# Patient Record
Sex: FEMALE | Race: WHITE | NOT HISPANIC OR LATINO | Employment: OTHER | URBAN - METROPOLITAN AREA
[De-identification: names, ages, dates, MRNs, and addresses within clinical notes are randomized per-mention and may not be internally consistent; named-entity substitution may affect disease eponyms.]

---

## 2017-02-28 ENCOUNTER — GENERIC CONVERSION - ENCOUNTER (OUTPATIENT)
Dept: OTHER | Facility: OTHER | Age: 67
End: 2017-02-28

## 2017-04-12 ENCOUNTER — DOCTOR'S OFFICE (OUTPATIENT)
Dept: URBAN - METROPOLITAN AREA CLINIC 137 | Facility: CLINIC | Age: 67
Setting detail: OPHTHALMOLOGY
End: 2017-04-12
Payer: COMMERCIAL

## 2017-04-12 ENCOUNTER — OPTICAL OFFICE (OUTPATIENT)
Dept: URBAN - METROPOLITAN AREA CLINIC 146 | Facility: CLINIC | Age: 67
Setting detail: OPHTHALMOLOGY
End: 2017-04-12

## 2017-04-12 DIAGNOSIS — H04.123: ICD-10-CM

## 2017-04-12 DIAGNOSIS — H52.4: ICD-10-CM

## 2017-04-12 DIAGNOSIS — H25.13: ICD-10-CM

## 2017-04-12 DIAGNOSIS — Z79.891: ICD-10-CM

## 2017-04-12 PROCEDURE — 92134 CPTRZ OPH DX IMG PST SGM RTA: CPT | Performed by: OPHTHALMOLOGY

## 2017-04-12 PROCEDURE — S0500 DISPOS CONT LENS: HCPCS | Performed by: OPHTHALMOLOGY

## 2017-04-12 PROCEDURE — 92015 DETERMINE REFRACTIVE STATE: CPT | Performed by: OPHTHALMOLOGY

## 2017-04-12 PROCEDURE — 92014 COMPRE OPH EXAM EST PT 1/>: CPT | Performed by: OPHTHALMOLOGY

## 2017-04-12 ASSESSMENT — REFRACTION_OUTSIDERX
OS_ADD: +2.50
OD_VA3: 20/
OD_SPHERE: +2.50
OS_VA2: 20/25(J1)
OS_SPHERE: +3.00
OD_VA1: 20/20-
OS_CYLINDER: +0.25
OD_ADD: +2.50
OD_AXIS: 013
OD_CYLINDER: +0.50
OS_VA3: 20/
OD_SPHERE: +2.75
OU_VA: 20/
OD_VA3: 20/
OS_SPHERE: +3.25
OD_CYLINDER: +0.50
OD_VA1: 20/25+
OD_VA2: 20/25(J1)
OU_VA: 20/
OS_VA1: 20/30+
OS_VA2: 20/25(J1)
OS_AXIS: 090
OD_ADD: +2.50
OS_VA3: 20/
OS_ADD: +2.50
OS_VA1: 20/20-
OS_CYLINDER: +0.50
OS_AXIS: 034
OD_VA2: 20/25(J1)
OD_AXIS: 010

## 2017-04-12 ASSESSMENT — REFRACTION_AUTOREFRACTION
OD_AXIS: 024
OS_CYLINDER: +0.50
OS_AXIS: 069
OS_SPHERE: +3.00
OD_SPHERE: +3.00
OD_CYLINDER: +0.25

## 2017-04-12 ASSESSMENT — CONFRONTATIONAL VISUAL FIELD TEST (CVF)
OD_FINDINGS: FULL
OS_FINDINGS: FULL

## 2017-04-12 ASSESSMENT — REFRACTION_MANIFEST
OU_VA: 20/
OD_VA1: 20/
OD_VA3: 20/
OD_VA2: 20/
OS_VA1: 20/
OS_VA3: 20/
OS_VA2: 20/

## 2017-04-12 ASSESSMENT — REFRACTION_CURRENTRX
OD_OVR_VA: 20/
OS_OVR_VA: 20/
OD_ADD: +2.50
OS_CYLINDER: +0.50
OS_OVR_VA: 20/
OD_OVR_VA: 20/
OS_SPHERE: +2.75
OS_OVR_VA: 20/
OD_OVR_VA: 20/
OS_AXIS: 090
OS_VPRISM_DIRECTION: PROGS
OD_SPHERE: +2.25
OS_ADD: +2.50
OD_CYLINDER: +0.50
OD_AXIS: 010
OD_VPRISM_DIRECTION: PROGS

## 2017-04-12 ASSESSMENT — SPHEQUIV_DERIVED
OS_SPHEQUIV: 3.25
OD_SPHEQUIV: 3.125

## 2017-04-12 ASSESSMENT — VISUAL ACUITY
OS_BCVA: 20/20-2
OD_BCVA: 20/20-3

## 2017-05-04 ENCOUNTER — DOCTOR'S OFFICE (OUTPATIENT)
Dept: URBAN - METROPOLITAN AREA CLINIC 137 | Facility: CLINIC | Age: 67
Setting detail: OPHTHALMOLOGY
End: 2017-05-04
Payer: COMMERCIAL

## 2017-05-04 DIAGNOSIS — Z79.891: ICD-10-CM

## 2017-05-04 PROCEDURE — 92083 EXTENDED VISUAL FIELD XM: CPT | Performed by: OPHTHALMOLOGY

## 2017-11-06 ENCOUNTER — OPTICAL OFFICE (OUTPATIENT)
Dept: URBAN - METROPOLITAN AREA CLINIC 146 | Facility: CLINIC | Age: 67
Setting detail: OPHTHALMOLOGY
End: 2017-11-06

## 2017-11-06 ENCOUNTER — RX ONLY (RX ONLY)
Age: 67
End: 2017-11-06

## 2017-11-06 ENCOUNTER — DOCTOR'S OFFICE (OUTPATIENT)
Dept: URBAN - METROPOLITAN AREA CLINIC 137 | Facility: CLINIC | Age: 67
Setting detail: OPHTHALMOLOGY
End: 2017-11-06
Payer: COMMERCIAL

## 2017-11-06 DIAGNOSIS — Z79.891: ICD-10-CM

## 2017-11-06 DIAGNOSIS — H04.123: ICD-10-CM

## 2017-11-06 DIAGNOSIS — H52.4: ICD-10-CM

## 2017-11-06 DIAGNOSIS — H25.13: ICD-10-CM

## 2017-11-06 PROCEDURE — S0500 DISPOS CONT LENS: HCPCS | Performed by: OPHTHALMOLOGY

## 2017-11-06 PROCEDURE — 92014 COMPRE OPH EXAM EST PT 1/>: CPT | Performed by: OPHTHALMOLOGY

## 2017-11-06 ASSESSMENT — REFRACTION_OUTSIDERX
OD_AXIS: 010
OS_CYLINDER: +0.50
OD_SPHERE: +2.50
OD_ADD: +2.50
OD_VA3: 20/
OU_VA: 20/
OD_VA3: 20/
OS_AXIS: 034
OD_ADD: +2.50
OD_AXIS: 013
OS_AXIS: 090
OD_CYLINDER: +0.50
OS_CYLINDER: +0.25
OS_VA3: 20/
OD_SPHERE: +2.75
OS_VA2: 20/25(J1)
OD_VA2: 20/25(J1)
OS_SPHERE: +3.25
OD_VA2: 20/25(J1)
OS_SPHERE: +3.00
OS_VA2: 20/25(J1)
OD_VA1: 20/20-
OS_VA1: 20/20-
OS_ADD: +2.50
OS_ADD: +2.50
OS_VA3: 20/
OD_VA1: 20/25+
OU_VA: 20/
OS_VA1: 20/30+
OD_CYLINDER: +0.50

## 2017-11-06 ASSESSMENT — REFRACTION_CURRENTRX
OD_CYLINDER: +0.50
OS_OVR_VA: 20/
OD_OVR_VA: 20/
OD_ADD: +2.50
OS_OVR_VA: 20/
OS_AXIS: 090
OD_VPRISM_DIRECTION: PROGS
OS_SPHERE: +2.75
OD_SPHERE: +2.25
OD_OVR_VA: 20/
OS_VPRISM_DIRECTION: PROGS
OS_CYLINDER: +0.50
OS_ADD: +2.50
OD_OVR_VA: 20/
OS_OVR_VA: 20/
OD_AXIS: 010

## 2017-11-06 ASSESSMENT — REFRACTION_AUTOREFRACTION
OD_SPHERE: +3.00
OD_CYLINDER: +0.25
OS_AXIS: 069
OS_CYLINDER: +0.50
OD_AXIS: 024
OS_SPHERE: +3.00

## 2017-11-06 ASSESSMENT — REFRACTION_MANIFEST
OS_VA3: 20/
OS_VA1: 20/
OS_VA2: 20/
OD_VA3: 20/
OU_VA: 20/
OD_VA1: 20/
OD_VA2: 20/

## 2017-11-06 ASSESSMENT — VISUAL ACUITY
OS_BCVA: 20/25
OD_BCVA: 20/25

## 2017-11-06 ASSESSMENT — SPHEQUIV_DERIVED
OS_SPHEQUIV: 3.25
OD_SPHEQUIV: 3.125

## 2017-11-06 ASSESSMENT — CONFRONTATIONAL VISUAL FIELD TEST (CVF)
OD_FINDINGS: FULL
OS_FINDINGS: FULL

## 2018-05-07 ENCOUNTER — DOCTOR'S OFFICE (OUTPATIENT)
Dept: URBAN - METROPOLITAN AREA CLINIC 137 | Facility: CLINIC | Age: 68
Setting detail: OPHTHALMOLOGY
End: 2018-05-07
Payer: COMMERCIAL

## 2018-05-07 DIAGNOSIS — Z79.891: ICD-10-CM

## 2018-05-07 DIAGNOSIS — H04.123: ICD-10-CM

## 2018-05-07 DIAGNOSIS — H25.13: ICD-10-CM

## 2018-05-07 DIAGNOSIS — M06.9: ICD-10-CM

## 2018-05-07 PROCEDURE — 92083 EXTENDED VISUAL FIELD XM: CPT | Performed by: OPHTHALMOLOGY

## 2018-05-07 PROCEDURE — 92014 COMPRE OPH EXAM EST PT 1/>: CPT | Performed by: OPHTHALMOLOGY

## 2018-05-07 PROCEDURE — 92134 CPTRZ OPH DX IMG PST SGM RTA: CPT | Performed by: OPHTHALMOLOGY

## 2018-05-07 ASSESSMENT — CONFRONTATIONAL VISUAL FIELD TEST (CVF)
OD_FINDINGS: FULL
OS_FINDINGS: FULL

## 2018-05-07 ASSESSMENT — REFRACTION_OUTSIDERX
OD_VA2: 20/25(J1)
OS_VA1: 20/30+
OU_VA: 20/
OS_VA2: 20/25(J1)
OS_VA3: 20/
OS_SPHERE: +3.25
OD_CYLINDER: +0.50
OD_VA1: 20/20-
OD_SPHERE: +2.75
OD_ADD: +2.50
OS_AXIS: 034
OS_SPHERE: +3.00
OS_ADD: +2.50
OS_VA3: 20/
OD_VA1: 20/25+
OD_CYLINDER: +0.50
OS_VA2: 20/25(J1)
OS_CYLINDER: +0.25
OS_CYLINDER: +0.50
OD_VA3: 20/
OD_AXIS: 013
OS_ADD: +2.50
OS_VA1: 20/20-
OD_AXIS: 010
OD_VA3: 20/
OU_VA: 20/
OD_SPHERE: +2.50
OD_ADD: +2.50
OS_AXIS: 090
OD_VA2: 20/25(J1)

## 2018-05-07 ASSESSMENT — REFRACTION_CURRENTRX
OD_AXIS: 010
OS_OVR_VA: 20/
OS_ADD: +2.50
OD_SPHERE: +2.25
OD_VPRISM_DIRECTION: PROGS
OS_VPRISM_DIRECTION: PROGS
OS_CYLINDER: +0.50
OS_SPHERE: +2.75
OD_OVR_VA: 20/
OD_CYLINDER: +0.50
OD_ADD: +2.50
OS_OVR_VA: 20/
OS_AXIS: 090
OS_OVR_VA: 20/
OD_OVR_VA: 20/
OD_OVR_VA: 20/

## 2018-05-07 ASSESSMENT — REFRACTION_AUTOREFRACTION
OS_AXIS: 069
OD_CYLINDER: +0.25
OS_SPHERE: +3.00
OS_CYLINDER: +0.50
OD_AXIS: 024
OD_SPHERE: +3.00

## 2018-05-07 ASSESSMENT — REFRACTION_MANIFEST
OS_VA2: 20/
OD_VA3: 20/
OD_VA1: 20/
OS_VA1: 20/
OD_VA2: 20/
OU_VA: 20/
OS_VA3: 20/

## 2018-05-07 ASSESSMENT — VISUAL ACUITY
OD_BCVA: 20/40
OS_BCVA: 20/40

## 2018-05-07 ASSESSMENT — SPHEQUIV_DERIVED
OD_SPHEQUIV: 3.125
OS_SPHEQUIV: 3.25

## 2018-06-11 ENCOUNTER — OFFICE VISIT (OUTPATIENT)
Dept: OBGYN CLINIC | Facility: CLINIC | Age: 68
End: 2018-06-11
Payer: MEDICARE

## 2018-06-11 VITALS
BODY MASS INDEX: 22.78 KG/M2 | WEIGHT: 116 LBS | SYSTOLIC BLOOD PRESSURE: 122 MMHG | DIASTOLIC BLOOD PRESSURE: 64 MMHG | HEIGHT: 60 IN

## 2018-06-11 DIAGNOSIS — Z01.419 GYNECOLOGIC EXAM NORMAL: Primary | ICD-10-CM

## 2018-06-11 PROCEDURE — G0101 CA SCREEN;PELVIC/BREAST EXAM: HCPCS | Performed by: OBSTETRICS & GYNECOLOGY

## 2018-06-11 RX ORDER — HYDROXYCHLOROQUINE SULFATE 200 MG/1
200 TABLET, FILM COATED ORAL 2 TIMES DAILY WITH MEALS
COMMUNITY
End: 2021-08-13

## 2018-06-11 RX ORDER — SERTRALINE HYDROCHLORIDE 25 MG/1
50 TABLET, FILM COATED ORAL DAILY
COMMUNITY
End: 2021-08-13

## 2018-06-11 RX ORDER — POTASSIUM BICARB/MAG COMBO 21 500-300 MG
POWDER EFFERVESCENT (GRAM) ORAL
COMMUNITY

## 2018-06-11 RX ORDER — HYDROXYCHLOROQUINE SULFATE 200 MG/1
TABLET, FILM COATED ORAL DAILY
COMMUNITY
End: 2021-08-13

## 2018-06-11 NOTE — PROGRESS NOTES
Lanny Portillo is a 70 y o  female  who presents for annual well woman exam  Periods are absent since age 51  No bleeding, spotting, or discharge  Patient reports No hot flashes/night sweats, some difficulties with intercourse,  has ED and patient is having some vaginal dryness there using lubrication with some success,  sleeping fairly well  Patient has some evidence of lichen sclerosis fully counseled advised Cortaid 10 ointment and local care  Patient to call if this is not managing her symptoms  Patient also being treated for lymphoma doing well and in remission and she is seeing Dr Anton Lyon regularly and still sees Dr Iain Ruth for her breast care  Current contraception: post menopausal status  History of abnormal Pap smear: no  Family history of uterine or ovarian cancer: no  Regular self breast exam: yes  History of abnormal mammogram: no  Family history of breast cancer: no    Menstrual History:  OB History      Para Term  AB Living    2 2            SAB TAB Ectopic Multiple Live Births                      Menarche age: 14  No LMP recorded       Patient had mammogram a few months ago with Dr Iain Ruth and had a stable bone density 2017 she will be seeing Dr Gordon Juarez  The following portions of the patient's history were reviewed and updated as appropriate: allergies, current medications, past family history, past medical history, past social history, past surgical history and problem list   Past Medical History:   Diagnosis Date    Arthritis     IBS (irritable bowel syndrome)     Non Hodgkin's lymphoma ([facility] [address] )      Past Surgical History:   Procedure Laterality Date    APPENDECTOMY       SECTION      MASS EXCISION      in bladder due to lymphoma    SMALL INTESTINE SURGERY      resection    TONSILLECTOMY           OB History      Para Term  AB Living    2 2            SAB TAB Ectopic Multiple Live Births Review of Systems  Review of Systems   Constitutional: Negative for chills, fatigue, fever and unexpected weight change  HENT: Negative for dental problem, sinus pain and sinus pressure  Eyes: Negative for visual disturbance  Respiratory: Negative for cough, shortness of breath and wheezing  Cardiovascular: Negative for chest pain and leg swelling  Gastrointestinal: Negative for constipation, diarrhea, nausea and vomiting  Genitourinary: Negative for menstrual problem, pelvic pain and urgency  Musculoskeletal: Negative for back pain  Arthritis     Allergic/Immunologic: Negative for environmental allergies  Neurological: Negative for dizziness and headaches  Objective     Vitals:    18 0937   BP: 122/64         General:   alert and oriented, in no acute distress, alert, appears stated age and cooperative   Heart: regular rate and rhythm, S1, S2 normal, no murmur, click, rub or gallop   Lungs: clear to auscultation bilaterally   Abdomen: soft, non-tender, without masses or organomegaly   Vulva: Some signs of lichen sclerosis no active erythema   Vagina: normal mucosa   Cervix: no lesions   Uterus: mobile, non-tender   Adnexa: normal adnexa and no mass, fullness, tenderness   Breast inspection negative, no nipple discharge or bleeding, no masses or nodularity palpable  Rectal deferred, just had colonoscopy 2017  Pupils equal round reactive to light and accommodation  Throat clear no lesions or findings  Thyroid normal no masses or nodules            Assessment   77-year-old  recently diagnosed in 2017 with lymphoma in remission currently  Otherwise doing well  Plan   Pap smear not indicated  Patient has never had any abnormal   Patient has some signs of lichen sclerosis instructed on local care and treatment  Patient call if needs stronger steroid  Patient should return in 2 years or sooner as needed  Following breast with dr Keke Lemus

## 2019-05-02 ENCOUNTER — TRANSCRIBE ORDERS (OUTPATIENT)
Dept: ADMINISTRATIVE | Facility: HOSPITAL | Age: 69
End: 2019-05-02

## 2019-05-02 DIAGNOSIS — Z85.72 H/O NON-HODGKIN'S LYMPHOMA: Primary | ICD-10-CM

## 2019-05-08 ENCOUNTER — HOSPITAL ENCOUNTER (OUTPATIENT)
Dept: RADIOLOGY | Facility: HOSPITAL | Age: 69
Discharge: HOME | End: 2019-05-08
Attending: RADIOLOGY

## 2019-05-08 DIAGNOSIS — Z71.2 PERSON CONSULTING FOR EXPLANATION OF EXAMINATION OR TEST FINDING: ICD-10-CM

## 2019-11-15 ENCOUNTER — TELEPHONE (OUTPATIENT)
Dept: OBGYN CLINIC | Facility: CLINIC | Age: 69
End: 2019-11-15

## 2019-11-15 NOTE — TELEPHONE ENCOUNTER
Pt called to discuss a rash she has occurring on her genital area  Pt feels it has spread as well  Pt states it was from her cruise being in the pools and hot tubes  Pt has tried OTC vagisil cream and disitin cream either have helped  Pt states it is only a rash with irration  Denies any odor, discharge or pain  Pt called her PCP prior to calling our office who did not have apts either  What would you advise?

## 2020-01-07 ENCOUNTER — TELEPHONE (OUTPATIENT)
Dept: OBGYN CLINIC | Facility: CLINIC | Age: 70
End: 2020-01-07

## 2020-01-08 ENCOUNTER — OFFICE VISIT (OUTPATIENT)
Dept: OBGYN CLINIC | Facility: CLINIC | Age: 70
End: 2020-01-08
Payer: COMMERCIAL

## 2020-01-08 VITALS
SYSTOLIC BLOOD PRESSURE: 152 MMHG | HEIGHT: 60 IN | WEIGHT: 127 LBS | DIASTOLIC BLOOD PRESSURE: 82 MMHG | BODY MASS INDEX: 24.94 KG/M2

## 2020-01-08 DIAGNOSIS — N89.8 VAGINAL ITCHING: ICD-10-CM

## 2020-01-08 DIAGNOSIS — N90.4 LICHEN SCLEROSUS OF FEMALE GENITALIA: Primary | ICD-10-CM

## 2020-01-08 PROBLEM — Z85.79 ENCOUNTER FOR FOLLOW-UP SURVEILLANCE OF DIFFUSE LARGE B-CELL LYMPHOMA: Status: ACTIVE | Noted: 2019-09-07

## 2020-01-08 PROBLEM — C83.99: Status: ACTIVE | Noted: 2019-09-08

## 2020-01-08 PROBLEM — Z08 ENCOUNTER FOR FOLLOW-UP SURVEILLANCE OF DIFFUSE LARGE B-CELL LYMPHOMA: Status: ACTIVE | Noted: 2019-09-07

## 2020-01-08 PROBLEM — Z92.22: Status: ACTIVE | Noted: 2019-09-07

## 2020-01-08 PROCEDURE — 99214 OFFICE O/P EST MOD 30 MIN: CPT | Performed by: NURSE PRACTITIONER

## 2020-01-08 RX ORDER — CLOBETASOL PROPIONATE 0.5 MG/G
OINTMENT TOPICAL 2 TIMES DAILY
Qty: 60 G | Refills: 1 | Status: SHIPPED | OUTPATIENT
Start: 2020-01-08 | End: 2020-01-22

## 2020-01-08 NOTE — ASSESSMENT & PLAN NOTE
Wet mount absent yeast hyphae, trich, clue cells  Genital culture collected and sent per patient request  Wants to make sure she doesn't have any other infections  Will call with results and follow up accordingly

## 2020-01-08 NOTE — PROGRESS NOTES
Assessment/Plan:    Lichen sclerosus of female genitalia  Reviewed with patient diagnosis of Lichen Sclerosus first seen on exam in 2018  Explained diagnosis and likely a flare up that is causing such intense itching  Information on Lichen given to patient  Rx for clobetasol cream BID x 2 weeks then 1-3 x weekly thereafter x 6-12 months  Vaginal itching  Wet mount absent yeast hyphae, trich, clue cells  Genital culture collected and sent per patient request  Wants to make sure she doesn't have any other infections  Will call with results and follow up accordingly  Diagnoses and all orders for this visit:    Lichen sclerosus of female genitalia  -     clobetasol (TEMOVATE) 0 05 % ointment; Apply topically 2 (two) times a day for 14 days    Vaginal itching  -     GP Culture, Genital          Subjective:      Patient ID: Bere Panda is a 78 y o  female  Pt presents with complaints of vaginal itching  She was on a cruise in November when symptoms began  She started with a rash in vaginal area  She tried OTC Vagisil and Desitin, with no relief  She called office and was advised OTC Hydrocortisone cream and coconut oil  Has not seen any improvement  Itching is mainly external      Denies any new products vaginally  Denies any new sexual partners  - discharge  + odor (musty)  + itching  - burning    Very itchy  Denies any bleeding  Cold water alleviates, hot shower aggravates  The following portions of the patient's history were reviewed and updated as appropriate: allergies, current medications, past family history, past medical history, past social history, past surgical history and problem list     Review of Systems   Genitourinary:        Vaginal itching     All other systems reviewed and are negative          Objective:      /82 (BP Location: Right arm, Patient Position: Sitting, Cuff Size: Standard)   Ht 5' (1 524 m)   Wt 57 6 kg (127 lb)   BMI 24 80 kg/m² Physical Exam   Constitutional: She is oriented to person, place, and time  She appears well-developed and well-nourished  HENT:   Head: Normocephalic and atraumatic  Cardiovascular: Normal rate, regular rhythm and normal heart sounds  Pulmonary/Chest: Effort normal and breath sounds normal    Abdominal: Soft  Bowel sounds are normal  She exhibits no distension and no mass  There is no tenderness  There is no rebound and no guarding  Genitourinary: Vagina normal and uterus normal        No labial fusion  There is no rash, tenderness, lesion or injury on the right labia  There is no rash, tenderness, lesion or injury on the left labia  Cervix exhibits no motion tenderness, no discharge and no friability  Right adnexum displays no mass, no tenderness and no fullness  Left adnexum displays no mass, no tenderness and no fullness  Musculoskeletal: Normal range of motion  Neurological: She is alert and oriented to person, place, and time  Skin: Skin is warm and dry  Psychiatric: She has a normal mood and affect   Her behavior is normal  Judgment and thought content normal

## 2020-01-08 NOTE — ASSESSMENT & PLAN NOTE
Reviewed with patient diagnosis of Lichen Sclerosus first seen on exam in 2018  Explained diagnosis and likely a flare up that is causing such intense itching  Information on Lichen given to patient  Rx for clobetasol cream BID x 2 weeks then 1-3 x weekly thereafter x 6-12 months

## 2020-01-13 LAB — SL AMB GENITAL CULTURE: NORMAL

## 2020-11-17 ENCOUNTER — TRANSCRIBE ORDERS (OUTPATIENT)
Dept: ADMINISTRATIVE | Facility: HOSPITAL | Age: 70
End: 2020-11-17

## 2020-11-17 ENCOUNTER — HOSPITAL ENCOUNTER (OUTPATIENT)
Dept: RADIOLOGY | Facility: HOSPITAL | Age: 70
Discharge: HOME | End: 2020-11-17
Attending: INTERNAL MEDICINE
Payer: COMMERCIAL

## 2020-11-17 DIAGNOSIS — M70.62 TROCHANTERIC BURSITIS OF LEFT HIP: ICD-10-CM

## 2020-11-17 DIAGNOSIS — M25.552 LEFT HIP PAIN: Primary | ICD-10-CM

## 2020-11-17 DIAGNOSIS — M25.552 LEFT HIP PAIN: ICD-10-CM

## 2020-11-17 PROCEDURE — 73502 X-RAY EXAM HIP UNI 2-3 VIEWS: CPT

## 2021-01-21 ENCOUNTER — TELEMEDICINE (OUTPATIENT)
Dept: FAMILY MEDICINE CLINIC | Facility: CLINIC | Age: 71
End: 2021-01-21
Payer: COMMERCIAL

## 2021-01-21 VITALS — WEIGHT: 126 LBS | BODY MASS INDEX: 23.19 KG/M2 | HEIGHT: 62 IN

## 2021-01-21 DIAGNOSIS — R41.3 MEMORY IMPAIRMENT: ICD-10-CM

## 2021-01-21 DIAGNOSIS — C83.99: ICD-10-CM

## 2021-01-21 DIAGNOSIS — R41.3 MEMORY PROBLEM: Primary | ICD-10-CM

## 2021-01-21 PROCEDURE — 99203 OFFICE O/P NEW LOW 30 MIN: CPT | Performed by: FAMILY MEDICINE

## 2021-01-21 NOTE — PROGRESS NOTES
Virtual Regular Visit      Assessment/Plan:    Problem List Items Addressed This Visit        Other    Non-follicular (diffuse) lymphoma, unspecified, extranodal and solid organ sites [facility])     Pt states has been in remission for 3 yrs         Memory impairment     Appears mild pt wishes to see neurologist  Consult requested           Other Visit Diagnoses     Memory problem    -  Primary    Relevant Orders    Ambulatory referral to Neurology               Reason for visit is   Chief Complaint   Patient presents with    Annual Exam    Memory Loss    Virtual Regular Visit        Encounter provider Mercy Hernandez MD    Provider located at [address] [place] [id number] [phone]      Recent Visits  No visits were found meeting these conditions  Showing recent visits within past 7 days and meeting all other requirements     Today's Visits  Date Type Provider Dept   01/21/21 Telemedicine Mercy Hernandez MD [address] today's visits and meeting all other requirements     Future Appointments  No visits were found meeting these conditions  Showing future appointments within next 150 days and meeting all other requirements        The patient was identified by name and date of birth  Gerardo Gaviria was informed that this is a telemedicine visit and that the visit is being conducted through [employer] and patient was informed that this is not a secure, HIPAA-compliant platform  She agrees to proceed     My office door was closed  No one else was in the room  She acknowledged consent and understanding of privacy and security of the video platform  The patient has agreed to participate and understands they can discontinue the visit at any time  Patient is aware this is a billable service  Subjective  Gerardo Gaviria is a 69 y o  female pt not seen in over 5 yrs   pt had stage IV NH lymphoma pt has been in remission for 3 yrs sees dr Jennifer Leung oncology Dr Shae Mckeon urology  And GI Dr Magdi Mckeon   pt is concerned about her memory and wants to see a neurologist mother has altzheimers ot is able to  Manage her finances  Play er music forgets where she puts things or occasionally forgets names     HPI     Past Medical History:   Diagnosis Date    Anxiety     Arthritis     Bladder tumor     IBS (irritable bowel syndrome)     Non Hodgkin's lymphoma ([address] ) 2017    Renal calculi     Vasculitis of skin        Past Surgical History:   Procedure Laterality Date    APPENDECTOMY      BREAST BIOPSY       SECTION      X2    COLONOSCOPY      CYSTOSCOPY W/ DILATION OF BLADDER  2017    CYSTOSCOPY W/ RETROGRADES      0690,6379    CYSTOSCOPY W/ URETERAL STENT PLACEMENT      [id number]    CYSTOSCOPY W/ URETEROSCOPY          MASS EXCISION      in bladder due to [address]      resection    TONSILLECTOMY         Current Outpatient Medications   Medication Sig Dispense Refill    Cyanocobalamin (VITAMIN B-12) 3000 MCG/ML LIQD Place under the tongue      hydroxychloroquine (PLAQUENIL) 200 mg tablet Daily      Multiple Vitamins-Minerals (CENTRUM SILVER 50+WOMEN PO) Take by mouth      Polyethylene Glycol 3350 (MIRALAX PO) Take by mouth      sertraline (ZOLOFT) 50 mg tablet Take 50 mg by mouth daily      clobetasol (TEMOVATE) 0 05 % ointment Apply topically 2 (two) times a day for 14 days 60 g 1    hydroxychloroquine (PLAQUENIL) 200 mg tablet Take 200 mg by mouth 2 (two) times a day with meals      sertraline (ZOLOFT) 25 mg tablet Take 50 mg by mouth daily      Sertraline HCl (ZOLOFT PO) sertraline       No current facility-administered medications for this visit  Allergies   Allergen Reactions    Levofloxacin Hives    Penicillins Hives and Rash       Review of Systems   Neurological: Dizziness: problems with memory         Video Exam    Vitals:    21 1328   Weight: 57 2 kg (126 lb)   Height: 5' 2" (1 575 m)       Physical Exam  Constitutional:       General: She is not in acute distress  Appearance: Normal appearance  She is well-developed  She is not ill-appearing  Neck:      Musculoskeletal: Normal range of motion  Thyroid: No thyromegaly  Cardiovascular:      Rate and Rhythm: Normal rate  Pulmonary:      Effort: Pulmonary effort is normal  No respiratory distress  Breath sounds: Normal breath sounds  Neurological:      General: No focal deficit present  Mental Status: She is alert and oriented to person, place, and time  Mental status is at baseline  Psychiatric:         Behavior: Behavior normal          Thought Content: Thought content normal           I spent 15 minutes directly with the patient during this visit      [address] [place] [address] acknowledges that she has consented to an online visit or consultation  She understands that the online visit is based solely on information provided by her, and that, in the absence of a face-to-face physical evaluation by the physician, the diagnosis she receives is both limited and provisional in terms of accuracy and completeness  This is not intended to replace a full medical face-to-face evaluation by the physician  Dina Jim understands and accepts these terms

## 2021-01-29 ENCOUNTER — TELEPHONE (OUTPATIENT)
Dept: GASTROENTEROLOGY | Facility: CLINIC | Age: 71
End: 2021-01-29

## 2021-02-13 DIAGNOSIS — Z23 ENCOUNTER FOR IMMUNIZATION: ICD-10-CM

## 2021-02-17 ENCOUNTER — IMMUNIZATIONS (OUTPATIENT)
Dept: FAMILY MEDICINE CLINIC | Facility: HOSPITAL | Age: 71
End: 2021-02-17

## 2021-02-17 DIAGNOSIS — Z23 ENCOUNTER FOR IMMUNIZATION: Primary | ICD-10-CM

## 2021-02-17 PROCEDURE — 91300 SARS-COV-2 / COVID-19 MRNA VACCINE (PFIZER-BIONTECH) 30 MCG: CPT

## 2021-02-17 PROCEDURE — 0001A SARS-COV-2 / COVID-19 MRNA VACCINE (PFIZER-BIONTECH) 30 MCG: CPT

## 2021-03-09 ENCOUNTER — IMMUNIZATIONS (OUTPATIENT)
Dept: FAMILY MEDICINE CLINIC | Facility: HOSPITAL | Age: 71
End: 2021-03-09

## 2021-03-09 DIAGNOSIS — Z23 ENCOUNTER FOR IMMUNIZATION: Primary | ICD-10-CM

## 2021-03-09 PROCEDURE — 91300 SARS-COV-2 / COVID-19 MRNA VACCINE (PFIZER-BIONTECH) 30 MCG: CPT

## 2021-03-09 PROCEDURE — 0002A SARS-COV-2 / COVID-19 MRNA VACCINE (PFIZER-BIONTECH) 30 MCG: CPT

## 2021-03-24 DIAGNOSIS — R93.89 ENDOMETRIAL THICKENING ON ULTRASOUND: Primary | ICD-10-CM

## 2021-03-24 NOTE — PROGRESS NOTES
Pelvic ultrasound ordered to evaluate endometrial lining    Pt will call central scheduling to make appt, most likely at [address] [place] [address]

## 2021-03-26 ENCOUNTER — HOSPITAL ENCOUNTER (OUTPATIENT)
Dept: ULTRASOUND IMAGING | Facility: HOSPITAL | Age: 71
Discharge: HOME | End: 2021-03-26
Payer: COMMERCIAL

## 2021-03-26 DIAGNOSIS — R93.89 ENDOMETRIAL THICKENING ON ULTRASOUND: ICD-10-CM

## 2021-03-26 PROCEDURE — 76856 US EXAM PELVIC COMPLETE: CPT

## 2021-03-26 PROCEDURE — 76830 TRANSVAGINAL US NON-OB: CPT

## 2021-04-01 ENCOUNTER — TELEPHONE (OUTPATIENT)
Dept: NEUROLOGY | Facility: CLINIC | Age: 71
End: 2021-04-01

## 2021-04-01 NOTE — TELEPHONE ENCOUNTER
Best contact number for patient: [phone]    Referring provider and telephone number: Carla Dillon       Primary Care Provider Name and if affiliated with [facility]: Same    Reason for Appointment/Dx:  Memory    Have you seen and followed up with a pediatric Neurologist for this disease in the past?  No    Neurology Location patient would like to be seen: [facility] received? Yes                                                 Records Received?      Have you ever seen another Neurologist?   No    Insurance Information    Insurance Name: Shay Gonzalez    Notes: Mailed new patient paperwork    Appointment date: 06/22/21 with Dr Clive Cummings

## 2021-04-20 ENCOUNTER — PROCEDURE VISIT (OUTPATIENT)
Dept: OBGYN CLINIC | Facility: CLINIC | Age: 71
End: 2021-04-20

## 2021-04-20 VITALS
BODY MASS INDEX: 23.05 KG/M2 | DIASTOLIC BLOOD PRESSURE: 66 MMHG | SYSTOLIC BLOOD PRESSURE: 130 MMHG | TEMPERATURE: 97.3 F | WEIGHT: 126 LBS

## 2021-04-20 DIAGNOSIS — R93.89 THICKENED ENDOMETRIUM: Primary | ICD-10-CM

## 2021-04-20 PROBLEM — Z92.21 PERSONAL HISTORY OF CHEMOTHERAPY: Status: ACTIVE | Noted: 2020-10-31

## 2021-04-20 PROCEDURE — 88305 TISSUE EXAM BY PATHOLOGIST: CPT | Performed by: PATHOLOGY

## 2021-04-20 RX ORDER — POLYETHYLENE GLYCOL 1000
POWDER (GRAM) MISCELLANEOUS
COMMUNITY
End: 2021-12-06 | Stop reason: CLARIF

## 2021-04-20 RX ORDER — CLOBETASOL PROPIONATE 0.5 MG/G
CREAM TOPICAL
COMMUNITY
End: 2022-04-01

## 2021-04-20 NOTE — PROGRESS NOTES
Endometrial biopsy    Date/Time: 2021 2:01 PM  Performed by: KATY Mann  Authorized by: KATY Mann   Universal Protocol:  Procedure performed by:  Consent: Verbal consent obtained  Written consent not obtained  Consent given by: patient  Time out: Immediately prior to procedure a "time out" was called to verify the correct patient, procedure, equipment, support staff and site/side marked as required  Timeout called at: 2021 1:20 PM   Patient understanding: patient states understanding of the procedure being performed  Patient consent: the patient's understanding of the procedure matches consent given  Procedure consent: procedure consent matches procedure scheduled  Relevant documents: relevant documents present and verified  Test results: test results available and properly labeled  Site marked: the operative site was not marked  Radiology Images displayed and confirmed  If images not available, report reviewed: imaging studies available  Patient identity confirmed: verbally with patient      Indication:     Indications comment: Thickened endometrial lining  Pre-procedure:     Premeds:  Acetaminophen  Procedure:     Procedure: endometrial biopsy with Pipelle      A bivalve speculum was placed in the vagina: yes      Cervix cleaned and prepped: yes      A paracervical block was performed: no      An intracervical block was performed: no      The cervix was dilated: no      Uterus sounded: yes      Uterus sound depth (cm):  6    Curettes used:  1    Specimen collected: specimen collected and sent to pathology      Patient tolerated procedure well with no complications: yes    Findings:     Uterus size:  Non-gravid    Cervix: stenotic      Adnexa: normal    Comments:     Procedure comments:     here for endometrial biopsy  Was found incidentally to have thickened endometrium on CT scan  Pelvic ultrasound  Resulted endometrial lining of 7 mm    Risks, benefits and alternatives reviewed  Procedure reviewed  Risks including but not limited to insufficient specimen, infection, bleeding, pain reviewed  Patient verbalized understanding desires endometrial biopsy today  Patient tolerated fairly  Biopsy sent for pathology   of note thin black fluid was obtained on 1st pass of Pipelle  Sent to pathology with tissue   will call with results   Signs and symptoms report reviewed

## 2021-04-20 NOTE — PATIENT INSTRUCTIONS
Endometrial Biopsy   WHAT YOU NEED TO KNOW:   Endometrial biopsy is a procedure to remove a tissue sample from the lining of your uterus  This procedure is done through your vagina  WHILE YOU ARE HERE:   Before your procedure:   · Informed consent  is a legal document that explains the tests, treatments, or procedures that you may need  Informed consent means you understand what will be done and can make decisions about what you want  You give your permission when you sign the consent form  You can have someone sign this form for you if you are not able to sign it  You have the right to understand your medical care in words you know  Before you sign the consent form, understand the risks and benefits of what will be done  Make sure all your questions are answered  · NSAIDs  decrease swelling and pain  You may need to take an NSAID before your procedure  Follow your healthcare provider's instruction on when to take it  During your procedure: You will be awake during the procedure  An ultrasound or hysteroscope (tube with a light and a camera on the end) may be used  This helps your healthcare provider see inside your uterus to find the best spot to get the tissue sample  He or she will then insert a speculum into your vagina  This is the same tool used during a Pap smear  The speculum allows your healthcare provider to see inside your vagina to your cervix  He or she may need to numb your cervix  Your healthcare provider will insert a small tube into your vagina and cervix to remove a piece of tissue from the lining of your uterus  The tissue sample will be sent to a lab to be tested  After your procedure:  Do not get up until your healthcare provider says it is okay  When your healthcare provider sees that you are okay, you may be able to go home  You may have mild pain, cramping, or spotting for a few days after your procedure  RISKS:   You could get an infection after your procedure   Your uterus may be damaged  Damage can cause heavy bleeding and pain  You may need surgery to repair the damage  CARE AGREEMENT:   You have the right to help plan your care  Learn about your health condition and how it may be treated  Discuss treatment options with your healthcare providers to decide what care you want to receive  You always have the right to refuse treatment  © Copyright [address] Information is for End User's use only and may not be sold, redistributed or otherwise used for commercial purposes  All illustrations and images included in CareNotes® are the copyrighted property of A D A M , Inc  or [facility] [employer]   The above information is an  only  It is not intended as medical advice for individual conditions or treatments  Talk to your doctor, nurse or pharmacist before following any medical regimen to see if it is safe and effective for you

## 2021-06-22 ENCOUNTER — CONSULT (OUTPATIENT)
Dept: NEUROLOGY | Facility: CLINIC | Age: 71
End: 2021-06-22
Payer: COMMERCIAL

## 2021-06-22 VITALS
HEART RATE: 67 BPM | BODY MASS INDEX: 22.68 KG/M2 | WEIGHT: 124 LBS | SYSTOLIC BLOOD PRESSURE: 134 MMHG | DIASTOLIC BLOOD PRESSURE: 72 MMHG

## 2021-06-22 DIAGNOSIS — R41.3 MEMORY PROBLEM: ICD-10-CM

## 2021-06-22 PROCEDURE — 99203 OFFICE O/P NEW LOW 30 MIN: CPT | Performed by: PSYCHIATRY & NEUROLOGY

## 2021-06-22 PROCEDURE — 1036F TOBACCO NON-USER: CPT | Performed by: PSYCHIATRY & NEUROLOGY

## 2021-06-22 NOTE — PROGRESS NOTES
Patient ID: Laura Lang is a 66 y o  female  Assessment/Plan:    Memory problem  Patient presents with concerns for memory issues, particularly due to the fact her Mother has dementia  - She reports no one has expressed significant concern about her memory and is able to handle finances  - MoCA 27/30, within normal limits  The patient lost points on the recall section only  - She wanted to know about "blood tests" for dementia, we discussed that we do not have a blood test that is routinely used in practice at this time  - I discussed with the patient that her symptoms are most likely age-related cognitive changes and due to her family history of dementia she is merely being hypervigilant, understandably so  - After discussing with the patient her MoCA results the patient felt reassured that she was not experiencing significant memory issues and per the patient this calmed her anxiety" she therefore did not want to pursue any additional workup at this time and so CT head, and labs that had been ordered and offered to the patient initially for which she was originally agreeable to were discontinued  - Pt can follow up with us in a year or sooner if needed  Diagnoses and all orders for this visit:    Memory problem  -     Ambulatory referral to Neurology  -     Cancel: Vitamin B12; Future  -     Cancel: Folate; Future  -     Cancel: TSH, 3rd generation with Free T4 reflex; Future  -     Cancel: CT head wo contrast; Future         Subjective:    Laura Lang is a 66 y o  female presenting as a consult for memory concerns  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  The patient is mainly worried because mother is 80 and has dementia and Parkinson and is concerned she is "heading down that road"  The pt is worried about memory lapses, not knowing the faces of kids she used 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 a 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, remembering where she was after sightreading music  She has to eloise up her music more then she used to   She has not placed things in inappropriate places, has not forgotten the use of objects (like what the purpose of the car key is for)  She manages her own finances without issue  Used to be able to multitask, but does have issues with forgetting activities she was doing  She is able to drive and handle finances  She is a  for a [employer]  Her  experiences similar memory issues but has not expressed concern  She was a former teacher and reports that she is only able to remember only the really good or bad students she had when she sees them  Her mom started showing symptoms aprox  15 years ago at age 80  The following portions of the patient's history were reviewed and updated as appropriate: allergies, current medications, past family history, past medical history, past social history, past surgical history and problem list          Objective:    Blood pressure 134/72, pulse 67, weight 56 2 kg (124 lb), last menstrual period 06/11/1998  Physical Exam  Vitals and nursing note reviewed  Constitutional:       General: She is not in acute distress  Appearance: She is well-developed  She is not diaphoretic  HENT:      Head: Normocephalic and atraumatic  Nose: Nose normal    Eyes:      General: Lids are normal          Right eye: No discharge  Left eye: No discharge  Extraocular Movements: Extraocular movements intact  Conjunctiva/sclera: Conjunctivae normal       Pupils: Pupils are equal, round, and reactive to light  Cardiovascular:      Rate and Rhythm: Normal rate and regular rhythm  Heart sounds: No murmur heard  No friction rub  No gallop  Pulmonary:      Effort: Pulmonary effort is normal  No respiratory distress  Breath sounds: Normal breath sounds  Abdominal:      General: Abdomen is flat  Palpations: Abdomen is soft  Musculoskeletal:         General: Normal range of motion  Cervical back: Normal range of motion        Right lower leg: No edema  Left lower leg: No edema  Skin:     General: Skin is warm and dry  Neurological:      General: No focal deficit present  Mental Status: She is oriented to person, place, and time  Psychiatric:         Speech: Speech normal          Behavior: Behavior normal          Thought Content: Thought content normal          Judgment: Judgment normal          Neurological Exam  Mental Status  Awake, alert and oriented to person, place and time  Recent and remote memory are intact  Speech is normal  Language is fluent with no aphasia  Able to perform serial calculations  Able to spell words backwards  Fund of knowledge is appropriate for level of education  Apraxia absent  MoCA 27/30  Cranial Nerves  CN II: Visual acuity is normal  Visual fields full to confrontation  Right funduscopic exam: disc intact  Left funduscopic exam: disc intact  CN III, IV, VI: Extraocular movements intact bilaterally  Normal lids and orbits bilaterally  Pupils equal round and reactive to light bilaterally  CN V: Facial sensation is normal   CN VII: Full and symmetric facial movement  CN VIII: Hearing is normal   CN IX, X: Palate elevates symmetrically  Normal gag reflex  CN XI: Shoulder shrug strength is normal   CN XII: Tongue midline without atrophy or fasciculations  Motor  Normal muscle bulk throughout  Normal muscle tone  No abnormal involuntary movements  Strength is 5/5 in all four extremities except as noted  Sensory  Sensation is intact to light touch, pinprick, vibration and proprioception in all four extremities  Reflexes  Deep tendon reflexes are 2+ and symmetric except as noted  Coordination  Right: Finger-to-nose normal  Heel-to-shin normal   Left: Finger-to-nose normal  Heel-to-shin normal     Gait  Casual gait is normal including stance, stride, and arm swing  ROS:    Review of Systems   Constitutional: Negative  Negative for appetite change and fever  HENT: Negative  Negative for hearing loss, tinnitus, trouble swallowing and voice change  Eyes: Negative  Negative for photophobia and pain  Respiratory: Negative  Negative for shortness of breath  Cardiovascular: Negative  Negative for palpitations  Gastrointestinal: Negative  Negative for nausea and vomiting  Endocrine: Negative  Negative for cold intolerance  Genitourinary: Negative  Negative for dysuria, frequency and urgency  Musculoskeletal: Negative  Negative for myalgias and neck pain  Skin: Negative  Negative for rash  Neurological: Negative  Negative for dizziness, tremors, seizures, syncope, facial asymmetry, speech difficulty, weakness, light-headedness, numbness and headaches  Memory problems - short term   Hematological: Negative  Does not bruise/bleed easily  Psychiatric/Behavioral: Positive for decreased concentration (having trouble multitasking and spelling)  Negative for confusion, hallucinations and sleep disturbance

## 2021-06-22 NOTE — ASSESSMENT & PLAN NOTE
Patient presents with concerns for memory issues, particularly due to the fact her Mother has dementia  - She reports no one has expressed significant concern about her memory and is able to handle finances  - MoCA 27/30, within normal limits  The patient lost points on the recall section only  - She wanted to know about "blood tests" for dementia, we discussed that we do not have a blood test that is routinely used in practice at this time  - I discussed with the patient that her symptoms are most likely age-related cognitive changes and due to her family history of dementia she is merely being hypervigilant, understandably so  - After discussing with the patient her MoCA results the patient felt reassured that she was not experiencing significant memory issues and per the patient this calmed her anxiety" she therefore did not want to pursue any additional workup at this time and so CT head, and labs that had been ordered and offered to the patient initially for which she was originally agreeable to were discontinued  - Pt can follow up with us in a year or sooner if needed

## 2021-08-13 ENCOUNTER — TELEPHONE (OUTPATIENT)
Dept: OBGYN CLINIC | Facility: HOSPITAL | Age: 71
End: 2021-08-13

## 2021-08-13 ENCOUNTER — OFFICE VISIT (OUTPATIENT)
Dept: FAMILY MEDICINE CLINIC | Facility: CLINIC | Age: 71
End: 2021-08-13
Payer: COMMERCIAL

## 2021-08-13 VITALS
OXYGEN SATURATION: 98 % | HEART RATE: 68 BPM | WEIGHT: 126 LBS | HEIGHT: 62 IN | TEMPERATURE: 98.6 F | RESPIRATION RATE: 16 BRPM | DIASTOLIC BLOOD PRESSURE: 80 MMHG | BODY MASS INDEX: 23.19 KG/M2 | SYSTOLIC BLOOD PRESSURE: 136 MMHG

## 2021-08-13 DIAGNOSIS — Z78.0 POSTMENOPAUSAL: Primary | ICD-10-CM

## 2021-08-13 DIAGNOSIS — S49.92XA INJURY OF LEFT SHOULDER, INITIAL ENCOUNTER: ICD-10-CM

## 2021-08-13 DIAGNOSIS — S46.002S OSTEOARTHRITIS OF LEFT SHOULDER DUE TO ROTATOR CUFF INJURY: ICD-10-CM
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
## 2021-08-13 DIAGNOSIS — S70.02XA CONTUSION OF LEFT HIP, INITIAL ENCOUNTER: ICD-10-CM

## 2021-08-13 DIAGNOSIS — S20.212A CONTUSION OF LEFT CHEST WALL, INITIAL ENCOUNTER: ICD-10-CM

## 2021-08-13 DIAGNOSIS — M19.112 OSTEOARTHRITIS OF LEFT SHOULDER DUE TO ROTATOR CUFF INJURY: ICD-10-CM

## 2021-08-13 PROCEDURE — 3288F FALL RISK ASSESSMENT DOCD: CPT | Performed by: FAMILY MEDICINE

## 2021-08-13 PROCEDURE — G0438 PPPS, INITIAL VISIT: HCPCS | Performed by: FAMILY MEDICINE

## 2021-08-13 PROCEDURE — 3008F BODY MASS INDEX DOCD: CPT | Performed by: FAMILY MEDICINE

## 2021-08-13 PROCEDURE — 1170F FXNL STATUS ASSESSED: CPT | Performed by: FAMILY MEDICINE

## 2021-08-13 PROCEDURE — 1100F PTFALLS ASSESS-DOCD GE2>/YR: CPT | Performed by: FAMILY MEDICINE

## 2021-08-13 PROCEDURE — 1125F AMNT PAIN NOTED PAIN PRSNT: CPT | Performed by: FAMILY MEDICINE

## 2021-08-13 PROCEDURE — 1160F RVW MEDS BY RX/DR IN RCRD: CPT | Performed by: FAMILY MEDICINE

## 2021-08-13 PROCEDURE — 99214 OFFICE O/P EST MOD 30 MIN: CPT | Performed by: FAMILY MEDICINE

## 2021-08-13 PROCEDURE — 3725F SCREEN DEPRESSION PERFORMED: CPT | Performed by: FAMILY MEDICINE

## 2021-08-13 PROCEDURE — 1036F TOBACCO NON-USER: CPT | Performed by: FAMILY MEDICINE

## 2021-08-13 NOTE — TELEPHONE ENCOUNTER
Dr Glaser Pen office is calling to schedule patient today because she fell & hurt her lt shoulder  Caller put through to nurse to triage

## 2021-08-13 NOTE — TELEPHONE ENCOUNTER
Spoke to patient's PCP office  Caller stated that the patient fell and has a lot of bruising on her shoulder and cannot lift it  She stated they are unsure if it is dislocated  Advised that patient needs to be seen in the ER  If there is a dislocation this is not something we can reduce in the office  She should see ER for immediate eval and follow up with us afterwards  Understanding verbalized

## 2021-08-13 NOTE — PATIENT INSTRUCTIONS
Medicare Preventive Visit Patient Instructions  Thank you for completing your Welcome to Medicare Visit or Medicare Annual Wellness Visit today  Your next wellness visit will be due in one year (8/14/2022)  The screening/preventive services that you may require over the next 5-10 years are detailed below  Some tests may not apply to you based off risk factors and/or age  Screening tests ordered at today's visit but not completed yet may show as past due  Also, please note that scanned in results may not display below  Preventive Screenings:  Service Recommendations Previous Testing/Comments   Colorectal Cancer Screening  * Colonoscopy    * Fecal Occult Blood Test (FOBT)/Fecal Immunochemical Test (FIT)  * Fecal DNA/Cologuard Test  * Flexible Sigmoidoscopy Age: 54-65 years old   Colonoscopy: every 10 years (may be performed more frequently if at higher risk)  OR  FOBT/FIT: every 1 year  OR  Cologuard: every 3 years  OR  Sigmoidoscopy: every 5 years  Screening may be recommended earlier than age 48 if at higher risk for colorectal cancer  Also, an individualized decision between you and your healthcare provider will decide whether screening between the ages of 74-80 would be appropriate  Colonoscopy: 12/11/2020  FOBT/FIT: Not on file  Cologuard: Not on file  Sigmoidoscopy: Not on file    Screening Current     Breast Cancer Screening Age: 36 years old  Frequency: every 1-2 years  Not required if history of left and right mastectomy Mammogram: 07/02/2020    Screening Current   Cervical Cancer Screening Between the ages of 21-29, pap smear recommended once every 3 years  Between the ages of 33-67, can perform pap smear with HPV co-testing every 5 years     Recommendations may differ for women with a history of total hysterectomy, cervical cancer, or abnormal pap smears in past  Pap Smear: 06/11/2018    Screening Not Indicated   Hepatitis C Screening Once for adults born between 1945 and 1965  More frequently in patients at high risk for Hepatitis C Hep C Antibody: Not on file        Diabetes Screening 1-2 times per year if you're at risk for diabetes or have pre-diabetes Fasting glucose: No results in last 5 years   A1C: No results in last 5 years    Screening Not Indicated  Screening Current   Cholesterol Screening Once every 5 years if you don't have a lipid disorder  May order more often based on risk factors  Lipid panel: Not on file    Due for Lipid Panel     Other Preventive Screenings Covered by Medicare:  1  Abdominal Aortic Aneurysm (AAA) Screening: covered once if your at risk  You're considered to be at risk if you have a family history of AAA  2  Lung Cancer Screening: covers low dose CT scan once per year if you meet all of the following conditions: (1) Age 50-69; (2) No signs or symptoms of lung cancer; (3) Current smoker or have quit smoking within the last 15 years; (4) You have a tobacco smoking history of at least 30 pack years (packs per day multiplied by number of years you smoked); (5) You get a written order from a healthcare provider  3  Glaucoma Screening: covered annually if you're considered high risk: (1) You have diabetes OR (2) Family history of glaucoma OR (3)  aged 48 and older OR (3)  American aged 72 and older  3  Osteoporosis Screening: covered every 2 years if you meet one of the following conditions: (1) You're estrogen deficient and at risk for osteoporosis based off medical history and other findings; (2) Have a vertebral abnormality; (3) On glucocorticoid therapy for more than 3 months; (4) Have primary hyperparathyroidism; (5) On osteoporosis medications and need to assess response to drug therapy  · Last bone density test (DXA Scan): Not on file  5  HIV Screening: covered annually if you're between the age of 12-76  Also covered annually if you are younger than 13 and older than 72 with risk factors for HIV infection   For pregnant patients, it is covered up to 3 times per pregnancy  Immunizations:  Immunization Recommendations   Influenza Vaccine Annual influenza vaccination during flu season is recommended for all persons aged >= 6 months who do not have contraindications   Pneumococcal Vaccine (Prevnar and Pneumovax)  * Prevnar = PCV13  * Pneumovax = PPSV23   Adults 25-60 years old: 1-3 doses may be recommended based on certain risk factors  Adults 72 years old: Prevnar (PCV13) vaccine recommended followed by Pneumovax (PPSV23) vaccine  If already received PPSV23 since turning 65, then PCV13 recommended at least one year after PPSV23 dose  Hepatitis B Vaccine 3 dose series if at intermediate or high risk (ex: diabetes, end stage renal disease, liver disease)   Tetanus (Td) Vaccine - COST NOT COVERED BY MEDICARE PART B Following completion of primary series, a booster dose should be given every 10 years to maintain immunity against tetanus  Td may also be given as tetanus wound prophylaxis  Tdap Vaccine - COST NOT COVERED BY MEDICARE PART B Recommended at least once for all adults  For pregnant patients, recommended with each pregnancy  Shingles Vaccine (Shingrix) - COST NOT COVERED BY MEDICARE PART B  2 shot series recommended in those aged 48 and above     Health Maintenance Due:      Topic Date Due    Hepatitis C Screening  Never done    Breast Cancer Screening: Mammogram  07/02/2021    Colorectal Cancer Screening  12/11/2021     Immunizations Due:      Topic Date Due    Pneumococcal Vaccine: 65+ Years (2 of 2 - PPSV23) 08/22/2017    Influenza Vaccine (1) 09/01/2021     Advance Directives   What are advance directives? Advance directives are legal documents that state your wishes and plans for medical care  These plans are made ahead of time in case you lose your ability to make decisions for yourself  Advance directives can apply to any medical decision, such as the treatments you want, and if you want to donate organs     What are the types of advance directives? There are many types of advance directives, and each state has rules about how to use them  You may choose a combination of any of the following:  · Living will: This is a written record of the treatment you want  You can also choose which treatments you do not want, which to limit, and which to stop at a certain time  This includes surgery, medicine, IV fluid, and tube feedings  · Durable power of  for healthcare [facility]): This is a written record that states who you want to make healthcare choices for you when you are unable to make them for yourself  This person, called a proxy, is usually a family member or a friend  You may choose more than 1 proxy  · Do not resuscitate (DNR) order:  A DNR order is used in case your heart stops beating or you stop breathing  It is a request not to have certain forms of treatment, such as CPR  A DNR order may be included in other types of advance directives  · Medical directive: This covers the care that you want if you are in a coma, near death, or unable to make decisions for yourself  You can list the treatments you want for each condition  Treatment may include pain medicine, surgery, blood transfusions, dialysis, IV or tube feedings, and a ventilator (breathing machine)  · Values history: This document has questions about your views, beliefs, and how you feel and think about life  This information can help others choose the care that you would choose  Why are advance directives important? An advance directive helps you control your care  Although spoken wishes may be used, it is better to have your wishes written down  Spoken wishes can be misunderstood, or not followed  Treatments may be given even if you do not want them  An advance directive may make it easier for your family to make difficult choices about your care  Fall Prevention    Fall prevention  includes ways to make your home and other areas safer   It also includes ways you can move more carefully to prevent a fall  Health conditions that cause changes in your blood pressure, vision, or muscle strength and coordination may increase your risk for falls  Medicines may also increase your risk for falls if they make you dizzy, weak, or sleepy  Fall prevention tips:   · Stand or sit up slowly  · Use assistive devices as directed  · Wear shoes that fit well and have soles that   · Wear a personal alarm  · Stay active  · Manage your medical conditions  Home Safety Tips:  · Add items to prevent falls in the bathroom  · Keep paths clear  · Install bright lights in your home  · Keep items you use often on shelves within reach  · Paint or place reflective tape on the edges of your stairs  © Copyright [employer] 2018 Information is for End User's use only and may not be sold, redistributed or otherwise used for commercial purposes   All illustrations and images included in CareNotes® are the copyrighted property of [employer]  or [address] [place] [address] [employer]

## 2021-08-13 NOTE — PROGRESS NOTES
Assessment and Plan:     Problem List Items Addressed This Visit     None           Preventive health issues were discussed with patient, and age appropriate screening tests were ordered as noted in patient's After Visit Summary  Personalized health advice and appropriate referrals for health education or preventive services given if needed, as noted in patient's After Visit Summary       History of Present Illness:     Patient presents for Medicare Annual Wellness visit    Patient Care Team:  Román Simon MD as PCP - General     Problem List:     Patient Active Problem List   Diagnosis    Encounter for follow-up surveillance of diffuse large B-cell lymphoma    Hx of monoclonal drug therapy    Non-follicular (diffuse) lymphoma, unspecified, extranodal and solid organ sites [facility])    Lichen sclerosus of female genitalia    Vaginal itching    Memory problem    Personal history of chemotherapy      Past Medical and Surgical History:     Past Medical History:   Diagnosis Date    Anxiety     Arthritis     Bladder tumor     Cancer ([facility] ) 2017    Lymphoma    IBS (irritable bowel syndrome)     Kidney stone several since the late     Non Hodgkin's lymphoma ([facility] ) 2017    Renal calculi     Urinary tract infection a few    Vasculitis of skin      Past Surgical History:   Procedure Laterality Date    APPENDECTOMY      BREAST BIOPSY       SECTION      X2    COLONOSCOPY      CYSTOSCOPY W/ DILATION OF BLADDER  2017    CYSTOSCOPY W/ RETROGRADES      [id number]    CYSTOSCOPY W/ URETERAL STENT PLACEMENT      [id number]    CYSTOSCOPY W/ URETEROSCOPY          MASS EXCISION      in bladder due to lymphoma    SMALL INTESTINE SURGERY      resection    TONSILLECTOMY        Family History:     Family History   Problem Relation Age of Onset    Stomach cancer Father     Cancer Father       Social History:     Social History     Socioeconomic History    Marital status: /Civil Union     Spouse name: None    Number of children: None    Years of education: None    Highest education level: None   Occupational History    None   Tobacco Use    Smoking status: Never Smoker    Smokeless tobacco: Never Used   Vaping Use    Vaping Use: Never used   Substance and Sexual Activity    Alcohol use: Yes     Alcohol/week: 0 0 standard drinks     Comment: social drinker    Drug use: No    Sexual activity: Yes     Partners: Male     Birth control/protection: Post-menopausal     Comment: pt declines hiv std testing   Other Topics Concern    None   Social History Narrative    None     Social Determinants of Health     Financial Resource Strain:     Difficulty of Paying Living Expenses:    Food Insecurity:     Worried About Running Out of Food in the Last Year:     Ran Out of Food in the Last Year:    Transportation Needs:     Lack of Transportation (Medical):      Lack of Transportation (Non-Medical):    Physical Activity:     Days of Exercise per Week:     Minutes of Exercise per Session:    Stress:     Feeling of Stress :    Social Connections:     Frequency of Communication with Friends and Family:     Frequency of Social Gatherings with Friends and Family:     Attends Taoism Services:     Active Member of Clubs or Organizations:     Attends Club or Organization Meetings:     Marital Status:    Intimate Partner Violence:     Fear of Current or Ex-Partner:     Emotionally Abused:     Physically Abused:     Sexually Abused:       Medications and Allergies:     Current Outpatient Medications   Medication Sig Dispense Refill    clobetasol (TEMOVATE) 0 05 % cream Apply topically      Cyanocobalamin (VITAMIN B-12) 3000 MCG/ML LIQD Place under the tongue      Multiple Vitamins-Minerals (CENTRUM SILVER 50+WOMEN PO) Take by mouth      Multiple Vitamins-Minerals (HIGH POTENCY MULTIVIT/MIN/IRON PO) Take by mouth      Polyethylene Glycol 3350 (MIRALAX PO) Take by mouth      sertraline (ZOLOFT) 50 mg tablet TAKE 1 TABLET BY MOUTH  DAILY      Cyanocobalamin 100 MCG LOZG Take 3,000 mcg by mouth daily (Patient not taking: Reported on 8/13/2021)      Polyethylene Glycol 1000 LIQD Take by mouth (Patient not taking: Reported on 8/13/2021)      sertraline (ZOLOFT) 50 mg tablet Take 50 mg by mouth daily (Patient not taking: Reported on 8/13/2021)       No current facility-administered medications for this visit  Allergies   Allergen Reactions    Levofloxacin Hives    Penicillins Hives and Rash      Immunizations:     Immunization History   Administered Date(s) Administered    H1N1, All Formulations 01/08/2010    INFLUENZA 09/07/2016, 11/06/2017, 10/22/2018    Influenza Split High Dose Preservative Free IM 10/22/2018    Pneumococcal Conjugate 13-Valent 08/22/2016    SARS-CoV-2 / COVID-19 mRNA IM (Pfizer-BioNTech) 02/17/2021, 03/09/2021    Zoster Vaccine Recombinant 09/28/2020    influenza, trivalent, adjuvanted 09/30/2019, 09/28/2020      Health Maintenance:         Topic Date Due    Hepatitis C Screening  Never done    Breast Cancer Screening: Mammogram  07/02/2021    Colorectal Cancer Screening  12/11/2021         Topic Date Due    Pneumococcal Vaccine: 65+ Years (2 of 2 - PPSV23) 08/22/2017    Influenza Vaccine (1) 09/01/2021      Medicare Health Risk Assessment:     Temp 98 6 °F (37 °C)   Resp 16   Ht 5' 2" (1 575 m)   Wt 57 2 kg (126 lb)   LMP 06/11/1998 (Approximate)   BMI 23 05 kg/m²      [facility] is here for her Initial Wellness visit  Health Risk Assessment:   Patient rates overall health as very good  Patient feels that their physical health rating is same  Patient is satisfied with their life  Eyesight was rated as same  Hearing was rated as same  Patient feels that their emotional and mental health rating is same  Patients states they are never, rarely angry  Patient states they are never, rarely unusually tired/fatigued  Pain experienced in the last 7 days has been some  Patient's pain rating has been 8/10  shoulder    Depression Screening:   PHQ-2 Score: 0      Fall Risk Screening: In the past year, patient has experienced: history of falling in past year    Number of falls: 1  Injured during fall?: Yes    Feels unsteady when standing or walking?: No    Worried about falling?: No      Urinary Incontinence Screening:   Patient has not leaked urine accidently in the last six months  Home Safety:  Patient does not have trouble with stairs inside or outside of their home  Patient has working smoke alarms and has working carbon monoxide detector  Home safety hazards include: none  Nutrition:   Current diet is Regular  Medications:   Patient is currently taking over-the-counter supplements  OTC medications include: supplements  Patient is able to manage medications  Activities of Daily Living (ADLs)/Instrumental Activities of Daily Living (IADLs):   Walk and transfer into and out of bed and chair?: Yes  Dress and groom yourself?: Yes    Bathe or shower yourself?: Yes    Feed yourself? Yes  Do your laundry/housekeeping?: Yes  Manage your money, pay your bills and track your expenses?: Yes  Make your own meals?: Yes    Do your own shopping?: Yes    Previous Hospitalizations:   Any hospitalizations or ED visits within the last 12 months?: No      Advance Care Planning:   Living will: Yes    Durable POA for healthcare:  Yes    Advanced directive: Yes    Advanced directive counseling given: Yes    Five wishes given: No    Patient declined ACP directive: No      Cognitive Screening:   Provider or family/friend/caregiver concerned regarding cognition?: No    PREVENTIVE SCREENINGS      Cardiovascular Screening:      Due for: Lipid Panel      Diabetes Screening:     General: Screening Not Indicated and Screening Current      Colorectal Cancer Screening:     General: Screening Current      Breast Cancer Screening:     General: Screening Current      Cervical Cancer Screening: General: Screening Not Indicated      Osteoporosis Screening:      Due for: DXA Axial      Abdominal Aortic Aneurysm (AAA) Screening:        General: Screening Not Indicated      Lung Cancer Screening:     General: Screening Not Indicated    Screening, Brief Intervention, and Referral to Treatment (SBIRT)    Screening  Typical number of drinks in a day: 0  Typical number of drinks in a week: 0  Interpretation: Low risk drinking behavior      Single Item Drug Screening:  How often have you used an illegal drug (including marijuana) or a prescription medication for non-medical reasons in the past year? never    Single Item Drug Screen Score: 0  Interpretation: Negative screen for possible drug use disorder      Sylvia Jackson MD

## 2021-08-13 NOTE — PROGRESS NOTES
Assessment/Plan:         Problem List Items Addressed This Visit     None            Subjective: pt fell 2 days ago  Tripped over an [place]ttoman she didn't see fell on left shoulder an hip on rug no LOC  Unable to move left shoulder has some bruising left upper chest no sob  No chest pain using tyelnol if needed for discomfort     Patient ID: Omar Sal is a 66 y o  female  HPI    The following portions of the patient's history were reviewed and updated as appropriate:   Past Medical History:  She has a past medical history of Anxiety, Arthritis, Bladder tumor, Cancer ([facility] [address] ) (2017), IBS (irritable bowel syndrome), Kidney stone (several since the late ), Non Hodgkin's [facility] ) (2017), Renal calculi, Urinary tract infection (a few), and Vasculitis of skin  ,  _______________________________________________________________________  Medical Problems:  does not have any pertinent problems on file ,  _______________________________________________________________________  Past Surgical History:   has a past surgical history that includes Tonsillectomy; Appendectomy;  section; Small intestine surgery; Mass excision; Colonoscopy; Breast biopsy; Cystoscopy; Cystoscopy w/ ureteral stent placement; Cystoscopy w/ dilation of bladder (); and Cystoscopy w/ ureteroscopy  ,  _______________________________________________________________________  Family History:  family history includes Cancer in her father; Stomach cancer in her father ,  _______________________________________________________________________  Social History:   reports that she has never smoked  She has never used smokeless tobacco  She reports current alcohol use  She reports that she does not use drugs  ,  _______________________________________________________________________  Allergies:  is allergic to levofloxacin and penicillins     _______________________________________________________________________  Current Outpatient Medications Medication Sig Dispense Refill    clobetasol (TEMOVATE) 0 05 % cream Apply topically      Cyanocobalamin (VITAMIN B-12) 3000 MCG/ML LIQD Place under the tongue      Multiple Vitamins-Minerals (CENTRUM SILVER 50+WOMEN PO) Take by mouth      Multiple Vitamins-Minerals (HIGH POTENCY MULTIVIT/MIN/IRON PO) Take by mouth      Polyethylene Glycol 3350 (MIRALAX PO) Take by mouth      sertraline (ZOLOFT) 50 mg tablet TAKE 1 TABLET BY MOUTH  DAILY      Cyanocobalamin 100 MCG LOZG Take 3,000 mcg by mouth daily (Patient not taking: Reported on 8/13/2021)      Polyethylene Glycol 1000 LIQD Take by mouth (Patient not taking: Reported on 8/13/2021)      sertraline (ZOLOFT) 50 mg tablet Take 50 mg by mouth daily (Patient not taking: Reported on 8/13/2021)       No current facility-administered medications for this visit      _______________________________________________________________________  Review of Systems   Musculoskeletal: Positive for arthralgias (left shoulder pain and limited motion; bruise left upper chest and smll bruise left hip  no hip pain)  Objective:  Vitals:    08/13/21 1133   Resp: 16   Temp: 98 6 °F (37 °C)   Weight: 57 2 kg (126 lb)   Height: 5' 2" (1 575 m)     Body mass index is 23 05 kg/m²  Physical Exam  Constitutional:       General: She is not in acute distress  Appearance: Normal appearance  She is well-developed  She is obese  She is not ill-appearing  Eyes:      Extraocular Movements: Extraocular movements intact  Pupils: Pupils are equal, round, and reactive to light  Cardiovascular:      Rate and Rhythm: Normal rate and regular rhythm  Pulses: Normal pulses  Heart sounds: Normal heart sounds  No murmur heard  Pulmonary:      Effort: Pulmonary effort is normal       Breath sounds: Normal breath sounds  Musculoskeletal:      Right lower leg: No edema  Left lower leg: No edema        Comments: Bruising left upper chest no pain to palpation left shoulder with limited mobility; neck elbow wrist all nl motion no pain; small bruise left hip nl ambulation no pain   Neurological:      General: No focal deficit present  Mental Status: She is alert and oriented to person, place, and time  Mental status is at baseline  Cranial Nerves: No cranial nerve deficit  Motor: No weakness  Coordination: Coordination normal       Gait: Gait normal       Deep Tendon Reflexes: Reflexes normal    Psychiatric:         Mood and Affect: Mood normal          Behavior: Behavior normal          Thought Content:  Thought content normal

## 2021-11-29 ENCOUNTER — TELEPHONE (OUTPATIENT)
Dept: GASTROENTEROLOGY | Facility: CLINIC | Age: 71
End: 2021-11-29

## 2021-12-06 ENCOUNTER — ANESTHESIA EVENT (OUTPATIENT)
Dept: GASTROENTEROLOGY | Facility: AMBULATORY SURGERY CENTER | Age: 71
End: 2021-12-06

## 2021-12-06 ENCOUNTER — HOSPITAL ENCOUNTER (OUTPATIENT)
Dept: GASTROENTEROLOGY | Facility: AMBULATORY SURGERY CENTER | Age: 71
Discharge: HOME | End: 2021-12-06
Payer: COMMERCIAL

## 2021-12-06 ENCOUNTER — ANESTHESIA (OUTPATIENT)
Dept: GASTROENTEROLOGY | Facility: AMBULATORY SURGERY CENTER | Age: 71
End: 2021-12-06

## 2021-12-06 VITALS
TEMPERATURE: 98.1 F | WEIGHT: 137 LBS | SYSTOLIC BLOOD PRESSURE: 142 MMHG | RESPIRATION RATE: 18 BRPM | OXYGEN SATURATION: 98 % | BODY MASS INDEX: 25.21 KG/M2 | HEIGHT: 62 IN | HEART RATE: 68 BPM | DIASTOLIC BLOOD PRESSURE: 63 MMHG

## 2021-12-06 DIAGNOSIS — Z86.010 HX OF COLONIC POLYPS: ICD-10-CM

## 2021-12-06 DIAGNOSIS — K21.9 GASTROESOPHAGEAL REFLUX DISEASE WITHOUT ESOPHAGITIS: ICD-10-CM

## 2021-12-06 DIAGNOSIS — C85.90 NON-HODGKIN'S LYMPHOMA, UNSPECIFIED BODY REGION, UNSPECIFIED NON-HODGKIN LYMPHOMA TYPE (HCC): ICD-10-CM

## 2021-12-06 DIAGNOSIS — K22.70 BARRETT'S ESOPHAGUS WITHOUT DYSPLASIA: ICD-10-CM

## 2021-12-06 PROCEDURE — 99100 ANES PT EXTEME AGE<1 YR&>70: CPT | Performed by: NURSE ANESTHETIST, CERTIFIED REGISTERED

## 2021-12-06 PROCEDURE — 43239 EGD BIOPSY SINGLE/MULTIPLE: CPT | Performed by: INTERNAL MEDICINE

## 2021-12-06 PROCEDURE — 45385 COLONOSCOPY W/LESION REMOVAL: CPT | Performed by: INTERNAL MEDICINE

## 2021-12-06 PROCEDURE — 00813 ANES UPR LWR GI NDSC PX: CPT | Performed by: NURSE ANESTHETIST, CERTIFIED REGISTERED

## 2021-12-06 RX ORDER — PROPOFOL 10 MG/ML
INJECTION, EMULSION INTRAVENOUS AS NEEDED
Status: DISCONTINUED | OUTPATIENT
Start: 2021-12-06 | End: 2021-12-06

## 2021-12-06 RX ORDER — SODIUM CHLORIDE 9 MG/ML
INJECTION, SOLUTION INTRAVENOUS CONTINUOUS PRN
Status: DISCONTINUED | OUTPATIENT
Start: 2021-12-06 | End: 2021-12-06

## 2021-12-06 RX ADMIN — PROPOFOL 50 MG: 10 INJECTION, EMULSION INTRAVENOUS at 09:56

## 2021-12-06 RX ADMIN — SODIUM CHLORIDE: 9 INJECTION, SOLUTION INTRAVENOUS at 09:46

## 2021-12-06 RX ADMIN — PROPOFOL 100 MG: 10 INJECTION, EMULSION INTRAVENOUS at 09:46

## 2021-12-06 RX ADMIN — PROPOFOL 50 MG: 10 INJECTION, EMULSION INTRAVENOUS at 10:01

## 2021-12-06 RX ADMIN — PROPOFOL 50 MG: 10 INJECTION, EMULSION INTRAVENOUS at 09:51

## 2022-01-03 ENCOUNTER — HOSPITAL ENCOUNTER (OUTPATIENT)
Dept: RADIOLOGY | Age: 72
Discharge: HOME | End: 2022-01-03
Payer: COMMERCIAL

## 2022-01-03 DIAGNOSIS — Z78.0 POSTMENOPAUSAL: ICD-10-CM

## 2022-01-03 PROCEDURE — 77080 DXA BONE DENSITY AXIAL: CPT

## 2022-01-26 DIAGNOSIS — Z76.0 MEDICATION REFILL: Primary | ICD-10-CM

## 2022-01-27 ENCOUNTER — RA CDI HCC (OUTPATIENT)
Dept: OTHER | Facility: HOSPITAL | Age: 72
End: 2022-01-27

## 2022-01-27 NOTE — PROGRESS NOTES
[facility]  coding opportunities       Chart reviewed, no opportunity found: CHART REVIEWED, NO OPPORTUNITY FOUND                        Patients insurance company: [facility] [address]  (Medicare Advantage and Commercial)

## 2022-02-02 ENCOUNTER — OFFICE VISIT (OUTPATIENT)
Dept: FAMILY MEDICINE CLINIC | Facility: CLINIC | Age: 72
End: 2022-02-02
Payer: COMMERCIAL

## 2022-02-02 VITALS
OXYGEN SATURATION: 99 % | TEMPERATURE: 97.1 F | DIASTOLIC BLOOD PRESSURE: 70 MMHG | HEIGHT: 62 IN | BODY MASS INDEX: 23.37 KG/M2 | RESPIRATION RATE: 16 BRPM | WEIGHT: 127 LBS | SYSTOLIC BLOOD PRESSURE: 110 MMHG | HEART RATE: 60 BPM

## 2022-02-02 DIAGNOSIS — M85.89 OSTEOPENIA OF MULTIPLE SITES: ICD-10-CM

## 2022-02-02 DIAGNOSIS — F41.1 GENERALIZED ANXIETY DISORDER: Primary | ICD-10-CM

## 2022-02-02 DIAGNOSIS — Z85.72 HISTORY OF B-CELL LYMPHOMA: ICD-10-CM

## 2022-02-02 DIAGNOSIS — N90.4 LICHEN SCLEROSUS OF FEMALE GENITALIA: ICD-10-CM

## 2022-02-02 PROBLEM — N89.8 VAGINAL ITCHING: Status: RESOLVED | Noted: 2020-01-08 | Resolved: 2022-02-02

## 2022-02-02 PROCEDURE — 1036F TOBACCO NON-USER: CPT | Performed by: FAMILY MEDICINE

## 2022-02-02 PROCEDURE — 1160F RVW MEDS BY RX/DR IN RCRD: CPT | Performed by: FAMILY MEDICINE

## 2022-02-02 PROCEDURE — 99214 OFFICE O/P EST MOD 30 MIN: CPT | Performed by: FAMILY MEDICINE

## 2022-02-02 PROCEDURE — 3008F BODY MASS INDEX DOCD: CPT | Performed by: FAMILY MEDICINE

## 2022-02-02 NOTE — PROGRESS NOTES
Assessment/Plan:         Problem List Items Addressed This Visit        Musculoskeletal and Integument    Osteopenia of multiple sites     Pt on Vit D advised to exercise with weight  Bearing as well            Genitourinary    Lichen sclerosus of female genitalia     On clobetasol prn            Other    Generalized anxiety disorder - Primary     Pt wishes to wean off medication feels well feels depression and anxiety  Was due to her illness  Start with 25mg po qd for 1 week then 25mg po qod for 1 week then stop         History of B-cell lymphoma     In remission 4 5 yrs followed by dr Troy Dia: pt here for interval visit pt with osteopenia now on vit d 2000 units  Pt wishes to wean off sertraline doesn't feel depressed or anxious  Anymore       Patient ID: Bertrand Saucedo is a 67 y o  female  HPI    The following portions of the patient's history were reviewed and updated as appropriate:   Past Medical History:  She has a past medical history of Anxiety, Arthritis, Bladder tumor, Cancer ([facility] [address] ) (2017), Colon cancer ([facility] [address] ), Colon polyp, IBS (irritable bowel syndrome), Kidney stone (several since the late ), Non Hodgkin's lymphoma ([facility] [address] ) (), Renal calculi, Urinary tract infection (a few), and Vasculitis of skin  ,  _______________________________________________________________________  Medical Problems:  does not have any pertinent problems on file ,  _______________________________________________________________________  Past Surgical History:   has a past surgical history that includes Tonsillectomy; Appendectomy;  section; Small intestine surgery; Mass excision; Colonoscopy; Breast biopsy; Cystoscopy; Cystoscopy w/ ureteral stent placement; Cystoscopy w/ dilation of bladder (); Cystoscopy w/ ureteroscopy; and US guided vascular access (2017)  ,  _______________________________________________________________________  Family History:  family history includes Alzheimer's disease in her mother; Cancer in her father; Stomach cancer in her father ,  _______________________________________________________________________  Social History:   reports that she has never smoked  She has never used smokeless tobacco  She reports current alcohol use  She reports that she does not use drugs  ,  _______________________________________________________________________  Allergies:  is allergic to levofloxacin and penicillins     _______________________________________________________________________  Current Outpatient Medications   Medication Sig Dispense Refill    Cyanocobalamin (VITAMIN B-12) 3000 MCG/ML LIQD Place under the tongue      Multiple Vitamins-Minerals (CENTRUM SILVER 50+WOMEN PO) Take by mouth      Polyethylene Glycol 3350 (MIRALAX PO) Take by mouth      sertraline (ZOLOFT) 50 mg tablet Take 1 tablet (50 mg total) by mouth daily 30 tablet 6    clobetasol (TEMOVATE) 0 05 % cream Apply topically (Patient not taking: Reported on 2/2/2022 )       No current facility-administered medications for this visit      _______________________________________________________________________  Review of Systems   Respiratory: Negative for cough, chest tightness and shortness of breath  Cardiovascular: Negative for chest pain, palpitations and leg swelling  Neurological: Negative for dizziness, weakness, light-headedness and headaches  Psychiatric/Behavioral: Negative for dysphoric mood  The patient is not nervous/anxious  Objective:  Vitals:    02/02/22 1320   BP: 110/70   BP Location: Left arm   Patient Position: Sitting   Cuff Size: Standard   Pulse: 60   Resp: 16   Temp: (!) 97 1 °F (36 2 °C)   TempSrc: Temporal   SpO2: 99%   Weight: 57 6 kg (127 lb)   Height: 5' 2" (1 575 m)     Body mass index is 23 23 kg/m²  Physical Exam  Constitutional:       General: She is not in acute distress  Appearance: Normal appearance  She is well-developed  She is obese   She is not ill-appearing  Eyes:      Extraocular Movements: Extraocular movements intact  Pupils: Pupils are equal, round, and reactive to light  Cardiovascular:      Rate and Rhythm: Normal rate and regular rhythm  Pulses: Normal pulses  Heart sounds: Normal heart sounds  No murmur heard  Pulmonary:      Effort: Pulmonary effort is normal       Breath sounds: Normal breath sounds  Musculoskeletal:      Right lower leg: No edema  Left lower leg: No edema  Lymphadenopathy:      Cervical: No cervical adenopathy  Neurological:      General: No focal deficit present  Mental Status: She is alert and oriented to person, place, and time  Mental status is at baseline  Psychiatric:         Mood and Affect: Mood normal          Behavior: Behavior normal          Thought Content:  Thought content normal

## 2022-02-02 NOTE — ASSESSMENT & PLAN NOTE
Pt wishes to wean off medication feels well feels depression and anxiety  Was due to her illness  Start with 25mg po qd for 1 week then 25mg po qod for 1 week then stop

## 2022-03-22 ENCOUNTER — HOSPITAL ENCOUNTER (EMERGENCY)
Facility: HOSPITAL | Age: 72
Discharge: HOME | End: 2022-03-22
Attending: EMERGENCY MEDICINE | Admitting: EMERGENCY MEDICINE
Payer: COMMERCIAL

## 2022-03-22 ENCOUNTER — APPOINTMENT (EMERGENCY)
Dept: RADIOLOGY | Facility: HOSPITAL | Age: 72
End: 2022-03-22
Payer: COMMERCIAL

## 2022-03-22 VITALS
HEART RATE: 74 BPM | OXYGEN SATURATION: 97 % | DIASTOLIC BLOOD PRESSURE: 69 MMHG | RESPIRATION RATE: 16 BRPM | TEMPERATURE: 97.6 F | SYSTOLIC BLOOD PRESSURE: 149 MMHG

## 2022-03-22 DIAGNOSIS — S93.601A RIGHT FOOT SPRAIN, INITIAL ENCOUNTER: Primary | ICD-10-CM

## 2022-03-22 PROCEDURE — 99282 EMERGENCY DEPT VISIT SF MDM: CPT | Performed by: EMERGENCY MEDICINE

## 2022-03-22 PROCEDURE — 73630 X-RAY EXAM OF FOOT: CPT

## 2022-03-22 PROCEDURE — 99283 EMERGENCY DEPT VISIT LOW MDM: CPT

## 2022-03-22 RX ORDER — ACETAMINOPHEN 160 MG/5ML
650 SUSPENSION, ORAL (FINAL DOSE FORM) ORAL ONCE
Status: COMPLETED | OUTPATIENT
Start: 2022-03-22 | End: 2022-03-22

## 2022-03-22 RX ADMIN — ACETAMINOPHEN 650 MG: 650 SUSPENSION ORAL at 09:58

## 2022-03-22 NOTE — DISCHARGE INSTRUCTIONS
Follow-up with PCP for further care  Use over the counter medications as stated on the bottle as needed for pain control  Return to the ED with new or worsening symptoms

## 2022-03-22 NOTE — ED ATTENDING ATTESTATION
3/22/2022  Denise Motley DO, saw and evaluated the patient  I have discussed the patient with the resident/non-physician practitioner and agree with the resident's/non-physician practitioner's findings, Plan of Care, and MDM as documented in the resident's/non-physician practitioner's note, except where noted  All available labs and Radiology studies were reviewed  I was present for key portions of any procedure(s) performed by the resident/non-physician practitioner and I was immediately available to provide assistance  At this point I agree with the current assessment done in the Emergency Department  I have conducted an independent evaluation of this patient a history and physical is as follows:    Patient presents for evaluation of pain and swelling in her right lateral proximal foot after a mechanical wobble while descending stairs in her home this morning  She states that her ankle wobbled both inverted and everted  She was able to abruptly sit on the stairs without falling or incurring other injury  She did not hit her head or have loss of consciousness  She does not take any anticoagulant or antiplatelet medications  She was able to get up and continue ambulating to her car, where she drove to work  But upon arrival, she was unable to ambulate on the right lower extremity due to pain  No prior fracture or surgery to that ankle or foot  ROS: Denies associated visual change, LH/dizziness, HA, midline neck or back pain, CP, SOB, abdominal pain, n/v  12 system ROS o/w negative       PE: NAD, appears comfortable, alert, GCS 15; PERRL, EOMI; no hemotympanum; no septal hematoma or epistaxis; MMM, no post OP exudate, edema or erythema, no dental trauma; no midline vert TTP, no crepitus or step-offs; HRR, no murmur; lungs CTA w/o w/r/r, POx 96% on RA (nl); abd s/nt/nd, nl BS in all four quadrants; (-) LE edema, no calf TTP, stable pelvis, FROM extremities x3, mildly limited ROM of the right ankle due to pain in the proximal foot, no TTP over the medial or lateral malleoli or distal fibula, strength and sensation appear intact; skin has a 4+ cm diameter area of dark ecchymosis with edema over the proximal, lateral right foot; skin is otherwise p/w/d; CN II-XII GI/NF, oriented  DDx: Near fall with right ankle/foot injury - musculoskeletal sprain/strain, proximal foot fracture, doubt ankle fracture or dislocation  A/P: Will check x-ray, treat symptoms, reevaluate for further work up and disposition            ED Course         Critical Care Time  Procedures

## 2022-03-22 NOTE — ED PROVIDER NOTES
History  Chief Complaint   Patient presents with    Ankle Pain     right ankle pain and swelling after her foot got lose from under here on the steps  -[employer] [facility]     Pt is a 78yo F who presents for R foot pain  Pt reports that she was walking down her stairs this morning when she stepped wrong on her R foot and 'moved abnormally'  Pt states she was able to lower herself onto a stair and therefore did not fall  Pt states that she was able to get up and walk on her foot with some pain and then drove herself to work  Pt states that when she went to get out of her car at work, she was no longer able to ambulate  Pt denies any previous injuries or surgeries to this foot or ankle  Pt states her pain is primarily in the lateral foot  Pt denies any other complaints at this time  Prior to Admission Medications   Prescriptions Last Dose Informant Patient Reported? Taking?    Cyanocobalamin (VITAMIN B-12) 3000 MCG/ML LIQD  Self Yes No   Sig: Place under the tongue   Multiple Vitamins-Minerals (CENTRUM SILVER 50+WOMEN PO)  Self Yes No   Sig: Take by mouth   Polyethylene Glycol 3350 (MIRALAX PO)  Self Yes No   Sig: Take by mouth   clobetasol (TEMOVATE) 0 05 % cream  Self Yes No   Sig: Apply topically   Patient not taking: Reported on 2022    sertraline (ZOLOFT) 50 mg tablet   No No   Sig: Take 1 tablet (50 mg total) by mouth daily      Facility-Administered Medications: None       Past Medical History:   Diagnosis Date    Anxiety     Arthritis     Bladder tumor     Cancer ([facility] ) 2017    Lymphoma    Colon cancer (HCC)     Non Hodgkin lymphoma bladder, colon, stomach 2017    Colon polyp     IBS (irritable bowel syndrome)     Kidney stone several since the late     Non Hodgkin's lymphoma ([facility] [address] ) 2017    Renal calculi     Urinary tract infection a few    Vasculitis of skin        Past Surgical History:   Procedure Laterality Date    APPENDECTOMY      BREAST BIOPSY       SECTION X2    COLONOSCOPY      CYSTOSCOPY W/ DILATION OF BLADDER  2017    CYSTOSCOPY W/ RETROGRADES      6006,3214    CYSTOSCOPY W/ URETERAL STENT PLACEMENT      7626,7664    CYSTOSCOPY W/ URETEROSCOPY      2014    MASS EXCISION      in bladder due to lymphoma    SMALL INTESTINE SURGERY      resection    TONSILLECTOMY      US GUIDED VASCULAR ACCESS  2/8/2017       Family History   Problem Relation Age of Onset    Stomach cancer Father     Cancer Father     Alzheimer's disease Mother      I have reviewed and agree with the history as documented  E-Cigarette/Vaping    E-Cigarette Use Never User      E-Cigarette/Vaping Substances    Nicotine No     THC No     CBD No     Flavoring No      Social History     Tobacco Use    Smoking status: Never Smoker    Smokeless tobacco: Never Used   Vaping Use    Vaping Use: Never used   Substance Use Topics    Alcohol use: Yes     Alcohol/week: 0 0 standard drinks     Comment: social drinker rare    Drug use: No        Review of Systems   Musculoskeletal:        R foot pain   All other systems reviewed and are negative  Physical Exam  ED Triage Vitals   Temperature Pulse Respirations Blood Pressure SpO2   03/22/22 0935 03/22/22 0935 03/22/22 0935 03/22/22 0935 03/22/22 0935   97 6 °F (36 4 °C) 79 16 (!) 200/76 96 %      Temp Source Heart Rate Source Patient Position - Orthostatic VS BP Location FiO2 (%)   03/22/22 0935 03/22/22 0945 03/22/22 0945 03/22/22 0945 --   Oral Monitor Lying Right arm       Pain Score       03/22/22 0935       6             Orthostatic Vital Signs  Vitals:    03/22/22 0935 03/22/22 0940 03/22/22 0945 03/22/22 1055   BP: (!) 200/76 (!) 185/81 (!) 175/74 149/69   Pulse: 79  74    Patient Position - Orthostatic VS:   Lying        Physical Exam  Vitals reviewed  Constitutional:       General: She is not in acute distress  Appearance: She is well-developed  She is not toxic-appearing or diaphoretic     HENT:      Head: Normocephalic and atraumatic  Right Ear: External ear normal       Left Ear: External ear normal       Nose: Nose normal    Eyes:      Extraocular Movements: Extraocular movements intact  Pupils: Pupils are equal, round, and reactive to light  Cardiovascular:      Rate and Rhythm: Normal rate and regular rhythm  Heart sounds: Normal heart sounds  Pulmonary:      Effort: Pulmonary effort is normal  No respiratory distress  Breath sounds: Normal breath sounds  Abdominal:      General: There is no distension  Palpations: Abdomen is soft  Tenderness: There is no abdominal tenderness  Musculoskeletal:      Cervical back: Normal range of motion and neck supple  Right ankle: No swelling or deformity  No tenderness  Decreased range of motion (Slight limitation in dorsiflexion 2/2 pain in the foot)  Normal pulse  Right Achilles Tendon: Normal       Left ankle: Normal  No swelling or deformity  Left Achilles Tendon: Normal       Right foot: Swelling and tenderness present  Feet:    Skin:     General: Skin is warm and dry  Capillary Refill: Capillary refill takes less than 2 seconds  Neurological:      Mental Status: She is alert and oriented to person, place, and time  Psychiatric:         Speech: Speech normal          Behavior: Behavior is cooperative  ED Medications  Medications   acetaminophen (TYLENOL) oral suspension 650 mg (650 mg Oral Given 3/22/22 0958)       Diagnostic Studies  Results Reviewed     None                 XR foot 3+ views RIGHT   ED Interpretation by Presley Dove DO (03/22 1049)   No acute osseous injury or joint effusion      Final Result by Maya Carbajal MD (03/22 1235)      No acute osseous abnormality  This report is in agreement with the preliminary interpretation           Workstation performed: [id number]               Procedures  Procedures      ED Course  ED Course as of 03/30/22 1128   Tue Mar 22, 2022   1049 XR foot 3+ views RIGHT  No acute findings on wet read  1100 Blood Pressure: 149/69  Interval improvement  1100 Pt updated on results and plan for compression and outpt f/u  Ace wrap applied and pt able to ambulate with slight limp  Pt offered crutches but declined stating that she is clumsy and would be more likely to fall with crutches  Pt stating her ankle feels stable and she feels okay to continue ambulating  Will plan for DC  Identification of Seniors at Risk      Most Recent Value   (ISAR) Identification of Seniors at Risk    Before the illness or injury that brought you to the Emergency, did you need someone to help you on a regular basis? 0 Filed at: 03/22/2022 0940   In the last 24 hours, have you needed more help than usual? 0 Filed at: 03/22/2022 1977   Have you been hospitalized for one or more nights during the past 6 months? 0 Filed at: 03/22/2022 0940   In general, do you see well? 0 Filed at: 03/22/2022 0940   In general, do you have serious problems with your memory? 0 Filed at: 03/22/2022 6546   Do you take more than three different medications every day? 1 Filed at: 03/22/2022 0940   ISAR Score 1 Filed at: 03/22/2022 0940                              MDM  Number of Diagnoses or Management Options  Right foot sprain, initial encounter  Diagnosis management comments: Pt is a 76yo F who presents with R foot pain  Exam pertinent for Swelling and tenderness in proximal foot  Differential diagnosis to include but not limited to fracture, dislocation, ligamentous injury, soft tissue injury  Will plan for XRs to r/o osseous abnormality and symptom control with tylenol  Pt reports improvement of symptoms on re-evaluation  Pt informed of XR results and plan for RICE therapy  Pt in agreement and declined offered crutches  Pt able to ambulate out of the department  Plan to discharge pt with f/u to PCP  Discussed returning the ED with significant worsening of symptoms   Discussed use of over the counter medications as stated on the bottle as needed for pain  Pt expressed understanding of discharge instructions, return precautions, and medication instructions  All questions were answered and pt was discharged without incident  Amount and/or Complexity of Data Reviewed  Tests in the radiology section of CPT®: reviewed and ordered  Discussion of test results with the performing providers: yes  Independent visualization of images, tracings, or specimens: yes        Disposition  Final diagnoses:   Right foot sprain, initial encounter     Time reflects when diagnosis was documented in both MDM as applicable and the Disposition within this note     Time User Action Codes Description Comment    3/22/2022 11:02 AM Cortez Jaylen Add [S93 601A] Right foot sprain, initial encounter       ED Disposition     ED Disposition Condition Date/Time Comment    Discharge Stable Tue Mar 22, 2022 11:02 AM Bossman Galvan discharge to home/self care  Follow-up Information     Follow up With Specialties Details Why Contact Nanette Godinez MD Family Medicine Call  For follow-up [address]  [address] [address]  [phone]            Discharge Medication List as of 3/22/2022 11:02 AM      CONTINUE these medications which have NOT CHANGED    Details   clobetasol (TEMOVATE) 0 05 % cream Apply topically, Historical Med      Cyanocobalamin (VITAMIN B-12) 3000 MCG/ML LIQD Place under the tongue, Historical Med      Multiple Vitamins-Minerals (CENTRUM SILVER 50+WOMEN PO) Take by mouth, Historical Med      Polyethylene Glycol 3350 (MIRALAX PO) Take by mouth, Historical Med      sertraline (ZOLOFT) 50 mg tablet Take 1 tablet (50 mg total) by mouth daily, Starting Wed 2022, Normal           No discharge procedures on file  PDMP Review     None           ED Provider  Attending physically available and evaluated Bossman Galvan   I managed the patient along with the ED Attending      Electronically Signed by         Luzmaria Fletcher MD  03/30/22 1123

## 2022-04-01 ENCOUNTER — OFFICE VISIT (OUTPATIENT)
Dept: FAMILY MEDICINE CLINIC | Facility: CLINIC | Age: 72
End: 2022-04-01
Payer: COMMERCIAL

## 2022-04-01 VITALS
HEART RATE: 68 BPM | TEMPERATURE: 98.6 F | BODY MASS INDEX: 23.37 KG/M2 | DIASTOLIC BLOOD PRESSURE: 72 MMHG | RESPIRATION RATE: 16 BRPM | WEIGHT: 127 LBS | SYSTOLIC BLOOD PRESSURE: 132 MMHG | OXYGEN SATURATION: 95 % | HEIGHT: 62 IN

## 2022-04-01 DIAGNOSIS — S93.401D MILD ANKLE SPRAIN, RIGHT, SUBSEQUENT ENCOUNTER: Primary | ICD-10-CM

## 2022-04-01 PROCEDURE — 99213 OFFICE O/P EST LOW 20 MIN: CPT | Performed by: FAMILY MEDICINE

## 2022-04-01 PROCEDURE — 1123F ACP DISCUSS/DSCN MKR DOCD: CPT | Performed by: FAMILY MEDICINE

## 2022-04-01 PROCEDURE — 1036F TOBACCO NON-USER: CPT | Performed by: FAMILY MEDICINE

## 2022-04-01 PROCEDURE — 1160F RVW MEDS BY RX/DR IN RCRD: CPT | Performed by: FAMILY MEDICINE

## 2022-04-01 PROCEDURE — 3008F BODY MASS INDEX DOCD: CPT | Performed by: FAMILY MEDICINE

## 2022-04-01 RX ORDER — PROPRANOLOL/HYDROCHLOROTHIAZID 40 MG-25MG
2 TABLET ORAL DAILY
COMMUNITY

## 2022-04-01 NOTE — PROGRESS NOTES
Assessment/Plan:         Problem List Items Addressed This Visit        Musculoskeletal and Integument    Mild ankle sprain, right, subsequent encounter - Primary     Pt to exercise ankle as directed                 Subjective: pt here for post Er visit  3/22 for sprained right ankle has been using brace able to bear weight  xray was negative      Patient ID: Angie Glass is a 67 y o  female  HPI    The following portions of the patient's history were reviewed and updated as appropriate:   Past Medical History:  She has a past medical history of Anxiety, Arthritis, Bladder tumor, Cancer ([facility] [address] ) (2017), Colon cancer ([facility] [address] ), Colon polyp, IBS (irritable bowel syndrome), Kidney stone (several since the late ), Non Hodgkin's lymphoma ([facility] [address] ) (), Renal calculi, Urinary tract infection (a few), and Vasculitis of skin  ,  _______________________________________________________________________  Medical Problems:  does not have any pertinent problems on file ,  _______________________________________________________________________  Past Surgical History:   has a past surgical history that includes Tonsillectomy; Appendectomy;  section; Small intestine surgery; Mass excision; Colonoscopy; Breast biopsy; Cystoscopy; Cystoscopy w/ ureteral stent placement; Cystoscopy w/ dilation of bladder (); Cystoscopy w/ ureteroscopy; and US guided vascular access (2017)  ,  _______________________________________________________________________  Family History:  family history includes Alzheimer's disease in her mother; Cancer in her father; Stomach cancer in her father ,  _______________________________________________________________________  Social History:   reports that she has never smoked  She has never used smokeless tobacco  She reports current alcohol use  She reports that she does not use drugs  ,  _______________________________________________________________________  Allergies:  is allergic to levofloxacin and penicillins     _______________________________________________________________________  Current Outpatient Medications   Medication Sig Dispense Refill    Cyanocobalamin (VITAMIN B-12) 3000 MCG/ML LIQD Place under the tongue      Multiple Vitamins-Minerals (CENTRUM SILVER 50+WOMEN PO) Take by mouth      Polyethylene Glycol 3350 (MIRALAX PO) Take by mouth      Turmeric 500 MG CAPS Take 2 capsules by mouth in the morning       No current facility-administered medications for this visit      _______________________________________________________________________  Review of Systems   Musculoskeletal: Positive for arthralgias (right ankle sprain)  Objective:  Vitals:    04/01/22 1308   BP: 132/72   BP Location: Left arm   Patient Position: Sitting   Pulse: 68   Resp: 16   Temp: 98 6 °F (37 °C)   SpO2: 95%   Weight: 57 6 kg (127 lb)   Height: 5' 2" (1 575 m)     Body mass index is 23 23 kg/m²  Physical Exam  Constitutional:       General: She is not in acute distress  Appearance: Normal appearance  Musculoskeletal:      Comments: Right ankle no edema mild tenderness lateral subtalar joint no malleolar tenderness   Some ecchymosis forefoot FROM able to weight bear   Neurological:      Mental Status: She is alert

## 2022-06-23 ENCOUNTER — TELEPHONE (OUTPATIENT)
Dept: NEUROLOGY | Facility: CLINIC | Age: 72
End: 2022-06-23

## 2022-06-23 NOTE — TELEPHONE ENCOUNTER
THE [facility] to confirm patient's 6/28/2022 @ 2 pm appointment with Dr Aruna Hill and to provide new office address of [address] [place] [address],[address], [place] for that appointment  Call back number given [phone]

## 2022-08-29 ENCOUNTER — TELEPHONE (OUTPATIENT)
Dept: FAMILY MEDICINE CLINIC | Facility: CLINIC | Age: 72
End: 2022-08-29

## 2022-09-08 ENCOUNTER — TELEPHONE (OUTPATIENT)
Dept: ADMINISTRATIVE | Facility: OTHER | Age: 72
End: 2022-09-08

## 2022-09-08 ENCOUNTER — OFFICE VISIT (OUTPATIENT)
Dept: FAMILY MEDICINE CLINIC | Facility: CLINIC | Age: 72
End: 2022-09-08
Payer: COMMERCIAL

## 2022-09-08 VITALS
RESPIRATION RATE: 18 BRPM | WEIGHT: 119 LBS | SYSTOLIC BLOOD PRESSURE: 132 MMHG | HEART RATE: 58 BPM | HEIGHT: 62 IN | OXYGEN SATURATION: 99 % | DIASTOLIC BLOOD PRESSURE: 64 MMHG | BODY MASS INDEX: 21.9 KG/M2 | TEMPERATURE: 97.2 F

## 2022-09-08 DIAGNOSIS — H25.9 AGE-RELATED CATARACT OF BOTH EYES, UNSPECIFIED AGE-RELATED CATARACT TYPE: ICD-10-CM

## 2022-09-08 DIAGNOSIS — Z85.72 HISTORY OF B-CELL LYMPHOMA: ICD-10-CM

## 2022-09-08 DIAGNOSIS — C83.38 DIFFUSE LARGE B-CELL LYMPHOMA OF LYMPH NODES OF MULTIPLE REGIONS (HCC): ICD-10-CM

## 2022-09-08 DIAGNOSIS — Z11.59 NEED FOR HEPATITIS C SCREENING TEST: Primary | ICD-10-CM

## 2022-09-08 DIAGNOSIS — Z01.818 PREOP EXAMINATION: ICD-10-CM

## 2022-09-08 DIAGNOSIS — Z00.00 MEDICARE ANNUAL WELLNESS VISIT, SUBSEQUENT: ICD-10-CM

## 2022-09-08 PROCEDURE — 1100F PTFALLS ASSESS-DOCD GE2>/YR: CPT | Performed by: FAMILY MEDICINE

## 2022-09-08 PROCEDURE — G0439 PPPS, SUBSEQ VISIT: HCPCS | Performed by: FAMILY MEDICINE

## 2022-09-08 PROCEDURE — 3725F SCREEN DEPRESSION PERFORMED: CPT | Performed by: FAMILY MEDICINE

## 2022-09-08 PROCEDURE — 1160F RVW MEDS BY RX/DR IN RCRD: CPT | Performed by: FAMILY MEDICINE

## 2022-09-08 PROCEDURE — 1125F AMNT PAIN NOTED PAIN PRSNT: CPT | Performed by: FAMILY MEDICINE

## 2022-09-08 PROCEDURE — 3288F FALL RISK ASSESSMENT DOCD: CPT | Performed by: FAMILY MEDICINE

## 2022-09-08 PROCEDURE — 1170F FXNL STATUS ASSESSED: CPT | Performed by: FAMILY MEDICINE

## 2022-09-08 PROCEDURE — 99214 OFFICE O/P EST MOD 30 MIN: CPT | Performed by: FAMILY MEDICINE

## 2022-09-08 PROCEDURE — 1090F PRES/ABSN URINE INCON ASSESS: CPT | Performed by: FAMILY MEDICINE

## 2022-09-08 RX ORDER — MELATONIN
1000 DAILY
COMMUNITY

## 2022-09-08 NOTE — PROGRESS NOTES
Assessment/Plan:         Problem List Items Addressed This Visit        Other    History of B-cell lymphoma     5 yr remission         Age-related cataract of both eyes     Cleared for surgery no form available         Preop examination     Cleared for surgery           Other Visit Diagnoses     Need for hepatitis C screening test    -  Primary    Relevant Orders    Hepatitis C Antibody ([facility])    Medicare annual wellness visit, subsequent        Relevant Orders    Lipid panel    Diffuse large B-cell lymphoma of lymph nodes of multiple regions ([facility] [address] )                Subjective: pt here for presurgical clearance  For cataract surgery     Patient ID: Albina Valerio is a 67 y o  female  HPI    The following portions of the patient's history were reviewed and updated as appropriate:   Past Medical History:  She has a past medical history of Anxiety, Arthritis, Bladder tumor, Cancer ([facility] [address] ) (2017), [facility] Colon polyp, IBS (irritable bowel syndrome), Kidney stone (several since the late ), Non Hodgkin's lymphoma ([facility] [address] ) (), Renal calculi, Urinary tract infection (a few), and Vasculitis of skin  ,  _______________________________________________________________________  Medical Problems:  does not have any pertinent problems on file ,  _______________________________________________________________________  Past Surgical History:   has a past surgical history that includes Tonsillectomy; Appendectomy;  section; Small intestine surgery; Mass excision; Colonoscopy; Breast biopsy; Cystoscopy; Cystoscopy w/ ureteral stent placement; Cystoscopy w/ dilation of bladder (); Cystoscopy w/ ureteroscopy; and US guided vascular access (2017)  ,  _______________________________________________________________________  Family History:  family history includes Alzheimer's disease in her mother; Cancer in her father; Stomach cancer in her father ,  _______________________________________________________________________  Social History:   reports that she has never smoked  She has never used smokeless tobacco  She reports current alcohol use  She reports that she does not use drugs  ,  _______________________________________________________________________  Allergies:  is allergic to levofloxacin and penicillins     _______________________________________________________________________  Current Outpatient Medications   Medication Sig Dispense Refill    cholecalciferol (VITAMIN D3) 1,000 units tablet Take 1,000 Units by mouth daily      Cyanocobalamin (VITAMIN B-12) 3000 MCG/ML LIQD Place under the tongue      Multiple Vitamins-Minerals (CENTRUM SILVER 50+WOMEN PO) Take by mouth      Polyethylene Glycol 3350 (MIRALAX PO) Take by mouth      Turmeric 500 MG CAPS Take 2 capsules by mouth in the morning       No current facility-administered medications for this visit      _______________________________________________________________________  Review of Systems   Constitutional: Negative for chills and fever  HENT: Negative for congestion  Respiratory: Negative for chest tightness, shortness of breath and wheezing  Cardiovascular: Negative for chest pain, palpitations and leg swelling  Gastrointestinal: Negative for abdominal pain, diarrhea, nausea and vomiting  Genitourinary: Negative for difficulty urinating, frequency and urgency  Skin: Negative for rash  Neurological: Negative for dizziness, light-headedness, numbness and headaches  Hematological: Does not bruise/bleed easily  Psychiatric/Behavioral: Negative for dysphoric mood  The patient is not nervous/anxious            Objective:  Vitals:    09/08/22 1100   BP: 140/66   BP Location: Left arm   Patient Position: Sitting   Cuff Size: Standard   Pulse: 58   Resp: 18   Temp: (!) 97 2 °F (36 2 °C)   TempSrc: Temporal   SpO2: 99%   Weight: 54 kg (119 lb)   Height: 5' 2" (1 575 m) Body mass index is 21 77 kg/m²  Physical Exam  Constitutional:       General: She is not in acute distress  Appearance: Normal appearance  She is well-developed  She is not ill-appearing  HENT:      Mouth/Throat:      Mouth: Mucous membranes are moist    Eyes:      Extraocular Movements: Extraocular movements intact  Conjunctiva/sclera: Conjunctivae normal       Pupils: Pupils are equal, round, and reactive to light  Neck:      Thyroid: No thyromegaly  Vascular: No carotid bruit  Cardiovascular:      Rate and Rhythm: Normal rate and regular rhythm  Pulses: Normal pulses  Heart sounds: Normal heart sounds  No murmur heard  Pulmonary:      Effort: Pulmonary effort is normal  No respiratory distress  Breath sounds: Normal breath sounds  Abdominal:      General: There is no distension  Palpations: Abdomen is soft  Tenderness: There is no abdominal tenderness  Musculoskeletal:      Cervical back: Normal range of motion  Lymphadenopathy:      Cervical: No cervical adenopathy  Skin:     General: Skin is warm and dry  Neurological:      General: No focal deficit present  Mental Status: She is alert and oriented to person, place, and time  Mental status is at baseline  Cranial Nerves: No cranial nerve deficit        Coordination: Coordination normal       Deep Tendon Reflexes: Reflexes normal    Psychiatric:         Mood and Affect: Mood normal          Behavior: Behavior normal

## 2022-09-08 NOTE — PATIENT INSTRUCTIONS
Medicare Preventive Visit Patient Instructions  Thank you for completing your Welcome to Medicare Visit or Medicare Annual Wellness Visit today  Your next wellness visit will be due in one year (9/9/2023)  The screening/preventive services that you may require over the next 5-10 years are detailed below  Some tests may not apply to you based off risk factors and/or age  Screening tests ordered at today's visit but not completed yet may show as past due  Also, please note that scanned in results may not display below  Preventive Screenings:  Service Recommendations Previous Testing/Comments   Colorectal Cancer Screening  * Colonoscopy    * Fecal Occult Blood Test (FOBT)/Fecal Immunochemical Test (FIT)  * Fecal DNA/Cologuard Test  * Flexible Sigmoidoscopy Age: 39-70 years old   Colonoscopy: every 10 years (may be performed more frequently if at higher risk)  OR  FOBT/FIT: every 1 year  OR  Cologuard: every 3 years  OR  Sigmoidoscopy: every 5 years  Screening may be recommended earlier than age 39 if at higher risk for colorectal cancer  Also, an individualized decision between you and your healthcare provider will decide whether screening between the ages of 74-80 would be appropriate  Colonoscopy: 12/06/2021  FOBT/FIT: Not on file  Cologuard: Not on file  Sigmoidoscopy: Not on file    History Colorectal Cancer     Breast Cancer Screening Age: 36 years old  Frequency: every 1-2 years  Not required if history of left and right mastectomy Mammogram: 07/02/2020    Screening Current   Cervical Cancer Screening Between the ages of 21-29, pap smear recommended once every 3 years  Between the ages of 33-67, can perform pap smear with HPV co-testing every 5 years     Recommendations may differ for women with a history of total hysterectomy, cervical cancer, or abnormal pap smears in past  Pap Smear: 06/11/2018    Screening Not Indicated   Hepatitis C Screening Once for adults born between 1945 and 1965  More frequently in patients at high risk for Hepatitis C Hep C Antibody: Not on file        Diabetes Screening 1-2 times per year if you're at risk for diabetes or have pre-diabetes Fasting glucose: No results in last 5 years (No results in last 5 years)  A1C: No results in last 5 years (No results in last 5 years)  Screening Not Indicated   Cholesterol Screening Once every 5 years if you don't have a lipid disorder  May order more often based on risk factors  Lipid panel: Not on file    Screening Current     Other Preventive Screenings Covered by Medicare:  1  Abdominal Aortic Aneurysm (AAA) Screening: covered once if your at risk  You're considered to be at risk if you have a family history of AAA  2  Lung Cancer Screening: covers low dose CT scan once per year if you meet all of the following conditions: (1) Age 50-69; (2) No signs or symptoms of lung cancer; (3) Current smoker or have quit smoking within the last 15 years; (4) You have a tobacco smoking history of at least 20 pack years (packs per day multiplied by number of years you smoked); (5) You get a written order from a healthcare provider  3  Glaucoma Screening: covered annually if you're considered high risk: (1) You have diabetes OR (2) Family history of glaucoma OR (3)  aged 48 and older OR (3)  American aged 72 and older  3  Osteoporosis Screening: covered every 2 years if you meet one of the following conditions: (1) You're estrogen deficient and at risk for osteoporosis based off medical history and other findings; (2) Have a vertebral abnormality; (3) On glucocorticoid therapy for more than 3 months; (4) Have primary hyperparathyroidism; (5) On osteoporosis medications and need to assess response to drug therapy  · Last bone density test (DXA Scan): 01/03/2022   5  HIV Screening: covered annually if you're between the age of 15-65  Also covered annually if you are younger than 13 and older than 72 with risk factors for HIV infection  For pregnant patients, it is covered up to 3 times per pregnancy  Immunizations:  Immunization Recommendations   Influenza Vaccine Annual influenza vaccination during flu season is recommended for all persons aged >= 6 months who do not have contraindications   Pneumococcal Vaccine   * Pneumococcal conjugate vaccine = PCV13 (Prevnar 13), PCV15 (Vaxneuvance), PCV20 (Prevnar 20)  * Pneumococcal polysaccharide vaccine = PPSV23 (Pneumovax) Adults 25-60 years old: 1-3 doses may be recommended based on certain risk factors  Adults 72 years old: 1-2 doses may be recommended based off what pneumonia vaccine you previously received   Hepatitis B Vaccine 3 dose series if at intermediate or high risk (ex: diabetes, end stage renal disease, liver disease)   Tetanus (Td) Vaccine - COST NOT COVERED BY MEDICARE PART B Following completion of primary series, a booster dose should be given every 10 years to maintain immunity against tetanus  Td may also be given as tetanus wound prophylaxis  Tdap Vaccine - COST NOT COVERED BY MEDICARE PART B Recommended at least once for all adults  For pregnant patients, recommended with each pregnancy  Shingles Vaccine (Shingrix) - COST NOT COVERED BY MEDICARE PART B  2 shot series recommended in those aged 48 and above     Health Maintenance Due:      Topic Date Due    Hepatitis C Screening  Never done    Breast Cancer Screening: Mammogram  07/02/2021     Immunizations Due:      Topic Date Due    Pneumococcal Vaccine: 65+ Years (2 - PPSV23 or PCV20) 08/22/2017    COVID-19 Vaccine (4 - Booster for Pfizer series) 01/05/2022    Influenza Vaccine (1) 09/01/2022     Advance Directives   What are advance directives? Advance directives are legal documents that state your wishes and plans for medical care  These plans are made ahead of time in case you lose your ability to make decisions for yourself   Advance directives can apply to any medical decision, such as the treatments you want, and if you want to donate organs  What are the types of advance directives? There are many types of advance directives, and each state has rules about how to use them  You may choose a combination of any of the following:  · Living will: This is a written record of the treatment you want  You can also choose which treatments you do not want, which to limit, and which to stop at a certain time  This includes surgery, medicine, IV fluid, and tube feedings  · Durable power of  for healthcare [facility]): This is a written record that states who you want to make healthcare choices for you when you are unable to make them for yourself  This person, called a proxy, is usually a family member or a friend  You may choose more than 1 proxy  · Do not resuscitate (DNR) order:  A DNR order is used in case your heart stops beating or you stop breathing  It is a request not to have certain forms of treatment, such as CPR  A DNR order may be included in other types of advance directives  · Medical directive: This covers the care that you want if you are in a coma, near death, or unable to make decisions for yourself  You can list the treatments you want for each condition  Treatment may include pain medicine, surgery, blood transfusions, dialysis, IV or tube feedings, and a ventilator (breathing machine)  · Values history: This document has questions about your views, beliefs, and how you feel and think about life  This information can help others choose the care that you would choose  Why are advance directives important? An advance directive helps you control your care  Although spoken wishes may be used, it is better to have your wishes written down  Spoken wishes can be misunderstood, or not followed  Treatments may be given even if you do not want them  An advance directive may make it easier for your family to make difficult choices about your care     Fall Prevention    Fall prevention  includes ways to make your home and other areas safer  It also includes ways you can move more carefully to prevent a fall  Health conditions that cause changes in your blood pressure, vision, or muscle strength and coordination may increase your risk for falls  Medicines may also increase your risk for falls if they make you dizzy, weak, or sleepy  Fall prevention tips:   · Stand or sit up slowly  · Use assistive devices as directed  · Wear shoes that fit well and have soles that   · Wear a personal alarm  · Stay active  · Manage your medical conditions  Home Safety Tips:  · Add items to prevent falls in the bathroom  · Keep paths clear  · Install bright lights in your home  · Keep items you use often on shelves within reach  · Paint or place reflective tape on the edges of your stairs  © Copyright [address] 2018 Information is for End User's use only and may not be sold, redistributed or otherwise used for commercial purposes   All illustrations and images included in CareNotes® are the copyrighted property of [employer]  or [address] [place] [address] [employer]

## 2022-09-08 NOTE — TELEPHONE ENCOUNTER
Upon review of the In Basket request we were able to locate, review, and update the patient chart as requested for [address] and 2022  Any additional questions or concerns should be emailed to the Practice Liaisons via [email or web address] email, please do not reply via In Basket      Thank you  Aleisha Guallpa

## 2022-09-08 NOTE — TELEPHONE ENCOUNTER
----- Message from Barrington Cushing sent at 9/8/2022 11:04 AM EDT -----  Regarding: care gap request  09/08/22 11:04 AM    Hello, our patient attached above has had Mammogram completed/performed  Please assist in updating the patient chart by pulling the Care Everywhere (CE) document  The date of service is July 25, 2022       Thank you,  Barrington Cushing  [facility]

## 2022-09-08 NOTE — PROGRESS NOTES
Assessment and Plan:     Problem List Items Addressed This Visit        Other    History of B-cell lymphoma     5 yr remission         Age-related cataract of both eyes     Cleared for surgery no form available         Preop examination     Cleared for surgery           Other Visit Diagnoses     Need for hepatitis C screening test    -  Primary    Relevant Orders    Hepatitis C Antibody ([facility])    Medicare annual wellness visit, subsequent        Relevant Orders    Lipid panel    Diffuse large B-cell lymphoma of lymph nodes of multiple regions [facility])               Preventive health issues were discussed with patient, and age appropriate screening tests were ordered as noted in patient's After Visit Summary  Personalized health advice and appropriate referrals for health education or preventive services given if needed, as noted in patient's After Visit Summary       History of Present Illness:     Patient presents for a Medicare Wellness Visit    HPI   Patient Care Team:  Mariela Hernandez MD as PCP - General (Family Medicine)     Review of Systems:     Review of Systems     Problem List:     Patient Active Problem List   Diagnosis    Encounter for follow-up surveillance of diffuse large B-cell lymphoma    Hx of monoclonal drug therapy    Non-follicular (diffuse) lymphoma, unspecified, extranodal and solid organ sites [facility])    Lichen sclerosus of female genitalia    Memory problem    Personal history of chemotherapy    Fall    Contusion of left chest wall    Contusion of left hip    Injury of left shoulder    Generalized anxiety disorder    Osteopenia of multiple sites    History of B-cell lymphoma    Mild ankle sprain, right, subsequent encounter    Age-related cataract of both eyes    Preop examination      Past Medical and Surgical History:     Past Medical History:   Diagnosis Date    Anxiety     Arthritis     Bladder tumor     Cancer ([facility] [address] ) 2017    Lymphoma    Colon cancer ([facility] [address] ) Non Hodgkin lymphoma bladder, colon, stomach 2017    Colon polyp     IBS (irritable bowel syndrome)     Kidney stone several since the late     Non Hodgkin's lymphoma ([facility] [address] ) 2017    Renal calculi     Urinary tract infection a few    Vasculitis of skin      Past Surgical History:   Procedure Laterality Date    APPENDECTOMY      BREAST BIOPSY       SECTION      X2    COLONOSCOPY      CYSTOSCOPY W/ DILATION OF BLADDER      CYSTOSCOPY W/ RETROGRADES      3912,5926    CYSTOSCOPY W/ URETERAL STENT PLACEMENT      1277,7121    CYSTOSCOPY W/ URETEROSCOPY          MASS EXCISION      in bladder due to [address]      resection    TONSILLECTOMY      US GUIDED VASCULAR ACCESS  2017      Family History:     Family History   Problem Relation Age of Onset    Alzheimer's disease Mother     Stomach cancer Father     Cancer Father       Social History:     Social History     Socioeconomic History    Marital status: /Civil Union     Spouse name: None    Number of children: None    Years of education: None    Highest education level: None   Occupational History    None   Tobacco Use    Smoking status: Never Smoker    Smokeless tobacco: Never Used   Vaping Use    Vaping Use: Never used   Substance and Sexual Activity    Alcohol use: Yes     Alcohol/week: 0 0 standard drinks     Comment: social drinker rare    Drug use: No    Sexual activity: Yes     Partners: Male     Birth control/protection: Post-menopausal     Comment: pt declines hiv std testing   Other Topics Concern    None   Social History Narrative    None     Social Determinants of Health     Financial Resource Strain: Low Risk     Difficulty of Paying Living Expenses: Not hard at all   Food Insecurity: Not on file   Transportation Needs: No Transportation Needs    Lack of Transportation (Medical): No    Lack of Transportation (Non-Medical):  No   Physical Activity: Not on file   Stress: Not on file   Social Connections: Not on file   Intimate Partner Violence: Not on file   Housing Stability: Not on file      Medications and Allergies:     Current Outpatient Medications   Medication Sig Dispense Refill    cholecalciferol (VITAMIN D3) 1,000 units tablet Take 1,000 Units by mouth daily      Cyanocobalamin (VITAMIN B-12) 3000 MCG/ML LIQD Place under the tongue      Multiple Vitamins-Minerals (CENTRUM SILVER 50+WOMEN PO) Take by mouth      Polyethylene Glycol 3350 (MIRALAX PO) Take by mouth      Turmeric 500 MG CAPS Take 2 capsules by mouth in the morning       No current facility-administered medications for this visit  Allergies   Allergen Reactions    Levofloxacin Hives    Penicillins Hives and Rash      Immunizations:     Immunization History   Administered Date(s) Administered    COVID-19 PFIZER VACCINE 0 3 ML IM 02/17/2021, 03/09/2021, 10/05/2021    H1N1, All Formulations 01/08/2010    INFLUENZA 09/07/2016, 11/06/2017, 10/22/2018    Influenza Split High Dose Preservative Free IM 10/22/2018    Influenza, seasonal, injectable 11/17/2021    Pneumococcal Conjugate 13-Valent 08/22/2016    Zoster Vaccine Recombinant 09/28/2020    influenza, trivalent, adjuvanted 09/30/2019, 09/28/2020      Health Maintenance:         Topic Date Due    Hepatitis C Screening  Never done    Breast Cancer Screening: Mammogram  07/02/2021         Topic Date Due    Pneumococcal Vaccine: 65+ Years (2 - PPSV23 or PCV20) 08/22/2017    COVID-19 Vaccine (4 - Booster for Cohn Peter series) 01/05/2022    Influenza Vaccine (1) 09/01/2022      Medicare Screening Tests and Risk Assessments:     Gordan Kayser is here for her Subsequent Wellness visit  Health Risk Assessment:   Patient rates overall health as good  Patient feels that their physical health rating is slightly worse  Patient is very satisfied with their life  Eyesight was rated as slightly worse  Hearing was rated as same   Patient feels that their emotional and mental health rating is same  Patients states they are sometimes angry  Patient states they are often unusually tired/fatigued  Pain experienced in the last 7 days has been some  Patient's pain rating has been 5/10  Patient states that she has experienced no weight loss or gain in last 6 months  Depression Screening:   PHQ-2 Score: 0      Fall Risk Screening: In the past year, patient has experienced: history of falling in past year    Number of falls: 1  Injured during fall?: Yes    Feels unsteady when standing or walking?: No    Worried about falling?: No      Urinary Incontinence Screening:   Patient has not leaked urine accidently in the last six months  Home Safety:  Patient does not have trouble with stairs inside or outside of their home  Patient has working smoke alarms and has working carbon monoxide detector  Home safety hazards include: none  Nutrition:   Current diet is Regular  Medications:   Patient is currently taking over-the-counter supplements  OTC medications include: see medication list  Patient is able to manage medications  Activities of Daily Living (ADLs)/Instrumental Activities of Daily Living (IADLs):   Walk and transfer into and out of bed and chair?: Yes  Dress and groom yourself?: Yes    Bathe or shower yourself?: Yes    Feed yourself?  Yes  Do your laundry/housekeeping?: Yes  Manage your money, pay your bills and track your expenses?: Yes  Make your own meals?: Yes    Do your own shopping?: Yes    Previous Hospitalizations:   Any hospitalizations or ED visits within the last 12 months?: Yes    How many hospitalizations have you had in the last year?: 1-2    Advance Care Planning:   Living will: Yes    Advanced directive: Yes      Cognitive Screening:   Provider or family/friend/caregiver concerned regarding cognition?: No    PREVENTIVE SCREENINGS      Cardiovascular Screening:    General: Screening Current      Diabetes Screening:     General: Screening Not Indicated      Colorectal Cancer Screening:     General: History Colorectal Cancer      Breast Cancer Screening:     General: Screening Current      Cervical Cancer Screening:    General: Screening Not Indicated      Osteoporosis Screening:    General: Screening Current      Lung Cancer Screening:     General: Screening Not Indicated      Hepatitis C Screening:      Hep C Screening Accepted: Yes      Screening, Brief Intervention, and Referral to Treatment (SBIRT)    Screening  Typical number of drinks in a day: 0  Typical number of drinks in a week: 0  Interpretation: Low risk drinking behavior      Single Item Drug Screening:  How often have you used an illegal drug (including marijuana) or a prescription medication for non-medical reasons in the past year? never    Single Item Drug Screen Score: 0  Interpretation: Negative screen for possible drug use disorder    No exam data present     Physical Exam:     /66 (BP Location: Left arm, Patient Position: Sitting, Cuff Size: Standard)   Pulse 58   Temp (!) 97 2 °F (36 2 °C) (Temporal)   Resp 18   Ht 5' 2" (1 575 m)   Wt 54 kg (119 lb)   LMP 06/11/1998 (Approximate)   SpO2 99%   BMI 21 77 kg/m²     Physical Exam     Soco Samuel MD

## 2022-09-09 ENCOUNTER — TELEPHONE (OUTPATIENT)
Dept: ADMINISTRATIVE | Facility: OTHER | Age: 72
End: 2022-09-09

## 2022-09-09 NOTE — TELEPHONE ENCOUNTER
Upon review of the In Basket request we were able to note that no further action is required  The patient chart is up to date as a result of a previous request       Any additional questions or concerns should be emailed to the Practice Liaisons via [email or web address] email, please do not reply via In Basket      Thank you  Per Jean

## 2022-09-09 NOTE — TELEPHONE ENCOUNTER
----- Message from Marquis Kirti MD sent at 9/8/2022 11:21 AM EDT -----  Mammo done [facility]

## 2022-09-14 ENCOUNTER — LAB (OUTPATIENT)
Dept: LAB | Facility: CLINIC | Age: 72
End: 2022-09-14
Payer: COMMERCIAL

## 2022-09-14 DIAGNOSIS — E78.00 ELEVATED CHOLESTEROL: Primary | ICD-10-CM

## 2022-09-14 DIAGNOSIS — Z11.59 NEED FOR HEPATITIS C SCREENING TEST: ICD-10-CM

## 2022-09-14 DIAGNOSIS — Z00.00 MEDICARE ANNUAL WELLNESS VISIT, SUBSEQUENT: ICD-10-CM

## 2022-09-14 LAB
CHOLEST SERPL-MCNC: 207 MG/DL
HCV AB SER QL: NORMAL
HDLC SERPL-MCNC: 64 MG/DL
LDLC SERPL CALC-MCNC: 126 MG/DL (ref 0–100)
NONHDLC SERPL-MCNC: 143 MG/DL
TRIGL SERPL-MCNC: 87 MG/DL

## 2022-09-14 PROCEDURE — 36415 COLL VENOUS BLD VENIPUNCTURE: CPT

## 2022-09-14 PROCEDURE — 86803 HEPATITIS C AB TEST: CPT

## 2022-09-14 PROCEDURE — 80061 LIPID PANEL: CPT

## 2022-10-13 ENCOUNTER — EVALUATION (OUTPATIENT)
Dept: PHYSICAL THERAPY | Facility: CLINIC | Age: 72
End: 2022-10-13
Payer: COMMERCIAL

## 2022-10-13 DIAGNOSIS — M70.62 TROCHANTERIC BURSITIS, LEFT HIP: ICD-10-CM

## 2022-10-13 DIAGNOSIS — M25.552 LEFT HIP PAIN: Primary | ICD-10-CM

## 2022-10-13 PROCEDURE — 97162 PT EVAL MOD COMPLEX 30 MIN: CPT | Performed by: PHYSICAL THERAPIST

## 2022-10-13 NOTE — PROGRESS NOTES
PT Evaluation     Today's date: 10/13/2022  Patient name: Anamaria Scott  : 1950  MRN: [id number]  Referring provider: Rohit Martinez MD  Dx:   Encounter Diagnosis     ICD-10-CM    1  Left hip pain  M25 552    2  Trochanteric bursitis, left hip  M70 62                   Assessment  Assessment details: Anamaria Scott is a 67 y o  female who presents to the clinic with complaints of chronic left hip pain  The patient presents with the above listed impairments  She demonstrates decreased hip ROM as well as decreased hip strength  She is limited with prolonged sitting as well as sleeping on her left side  She is eager to increase her overall functional performance and should benefit from skilled physical therapy  Thank you for the referral       Impairments: abnormal muscle firing, abnormal or restricted ROM, activity intolerance, impaired physical strength, lacks appropriate home exercise program, pain with function, poor posture  and poor body mechanics  Understanding of Dx/Px/POC: good   Prognosis: good    Goals  Impairment Goals  - Decrease pain by 50% in 4 weeks  - Increase left flexibility by 25% in 4 weeks  - Increase left lower extremity strength CytoPherx to 4+/5 in 4 weeks      Functional Goals  - Return to Prior Level of Function in 4 weeks  - Patient will be independent with HEP in 4 weeks  - Patient will be able to stand with decreased pain after sitting for over 30 minutes in 4 weeks  - Patient will be able to get off of the floor with decreased pain in 4 weeks       Plan  Patient would benefit from: skilled physical therapy  Planned modality interventions: cryotherapy, thermotherapy: hydrocollator packs and TENS  Planned therapy interventions: home exercise program, graded exercise, functional ROM exercises, flexibility, body mechanics training, postural training, patient education, therapeutic activities, therapeutic exercise, manual therapy, joint mobilization and neuromuscular re-education  Frequency: 2x week  Duration in weeks: 4  Treatment plan discussed with: patient        Subjective Evaluation    History of Present Illness  Mechanism of injury: HPI:  Patient notes that she has been having left hip pain starting ~2 years ago  She notes having discomfort after sitting for prolonged distances and getting off of the floor  She had been seeing Dr Tom Billings where she was diagnosed with left hip OA and was referred to physical therapy  Pain Location:  L Lateral Hip   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   Prior Functional Limitations: Independent prior   AGG:  Sitting for prolonged periods of time, sleeping in bed, crossing legs  Ease:  Medication, movement   Patient Goals:  "I want to move better"     Pain  Current pain ratin  At best pain ratin  At worst pain ratin  Quality: dull ache and pressure          Objective     Tenderness     Left Hip   Tenderness in the greater trochanter       Active Range of Motion     Additional Active Range of Motion Details  Lumbar Flexion - WNL  Lumbar Extension - WNL  Side bend - 75% to R, 50% to L w/ discomfort   Decreased hip external/internal rotation on L compared to R     Strength/Myotome Testing     Left Hip   Planes of Motion   Flexion: 4-  Abduction: 3+    Right Hip   Normal muscle strength    Left Knee   Extension: 4-               Diagnosis:    Precautions:    Manuals        PROM        Mobs                                There Ex        Bike        IT Band Stretch                                        Neruo Re-Ed        SLR        Bridge        SL Hip ABD        Clamshell        Side-Stepping                                                                               Ther Act                                         Modalities             CP PRN

## 2022-10-13 NOTE — LETTER
2022    Alexandr Gary MD  [address] [place] [address]    Patient: Kathie Rodriguez   YOB: 1950   Date of Visit: 10/13/2022     Encounter Diagnosis     ICD-10-CM    1  Left hip pain  M25 552    2  Trochanteric bursitis, left hip  M70 62        Dear Dr Drake Pablo: Thank you for your recent referral of Kathie Rodriguez  Please review the attached evaluation summary from Ashleigh's recent visit  Please verify that you agree with the plan of care by signing the attached order  If you have any questions or concerns, please do not hesitate to call  I sincerely appreciate the opportunity to share in the care of one of your patients and hope to have another opportunity to work with you in the near future  Sincerely,    Danni Gonzalez, PT      Referring Provider:      I certify that I have read the below Plan of Care and certify the need for these services furnished under this plan of treatment while under my care  Alexandr Gary MD  [address] [place] [address]  Via Fax: [phone]          PT Evaluation     Today's date: 10/13/2022  Patient name: Kathie Rodriguez  : 1950  MRN: [id number]  Referring provider: Hal Aguilar MD  Dx:   Encounter Diagnosis     ICD-10-CM    1  Left hip pain  M25 552    2  Trochanteric bursitis, left hip  M70 62                   Assessment  Assessment details: Kathie Rodriguez is a 67 y o  female who presents to the clinic with complaints of chronic left hip pain  The patient presents with the above listed impairments  She demonstrates decreased hip ROM as well as decreased hip strength  She is limited with prolonged sitting as well as sleeping on her left side  She is eager to increase her overall functional performance and should benefit from skilled physical therapy    Thank you for the referral       Impairments: abnormal muscle firing, abnormal or restricted ROM, activity intolerance, impaired physical strength, lacks appropriate home exercise program, pain with function, poor posture  and poor body mechanics  Understanding of Dx/Px/POC: good   Prognosis: good    Goals  Impairment Goals  - Decrease pain by 50% in 4 weeks  - Increase left flexibility by 25% in 4 weeks  - Increase left lower extremity strength golbally to 4+/5 in 4 weeks  Functional Goals  - Return to Prior Level of Function in 4 weeks  - Patient will be independent with HEP in 4 weeks  - Patient will be able to stand with decreased pain after sitting for over 30 minutes in 4 weeks  - Patient will be able to get off of the floor with decreased pain in 4 weeks       Plan  Patient would benefit from: skilled physical therapy  Planned modality interventions: cryotherapy, thermotherapy: hydrocollator packs and TENS  Planned therapy interventions: home exercise program, graded exercise, functional ROM exercises, flexibility, body mechanics training, postural training, patient education, therapeutic activities, therapeutic exercise, manual therapy, joint mobilization and neuromuscular re-education  Frequency: 2x week  Duration in weeks: 4  Treatment plan discussed with: patient        Subjective Evaluation    History of Present Illness  Mechanism of injury: HPI:  Patient notes that she has been having left hip pain starting ~2 years ago  She notes having discomfort after sitting for prolonged distances and getting off of the floor  She had been seeing Dr Job Alberts where she was diagnosed with left hip OA and was referred to physical therapy  Pain Location:  L Lateral Hip   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   Prior Functional Limitations:   Independent prior   AGG:  Sitting for prolonged periods of time, sleeping in bed, crossing legs  Ease:  Medication, movement   Patient Goals:  "I want to move better"     Pain  Current pain ratin  At best pain ratin  At worst pain ratin  Quality: dull ache and pressure          Objective     Tenderness     Left Hip   Tenderness in the greater trochanter       Active Range of Motion     Additional Active Range of Motion Details  Lumbar Flexion - WNL  Lumbar Extension - WNL  Side bend - 75% to R, 50% to L w/ discomfort   Decreased hip external/internal rotation on L compared to R     Strength/Myotome Testing     Left Hip   Planes of Motion   Flexion: 4-  Abduction: 3+    Right Hip   Normal muscle strength    Left Knee   Extension: 4-               Diagnosis:    Precautions:    Manuals        PROM        Mobs                                There Ex        Bike        IT Band Stretch                                        Neruo Re-Ed        SLR        Bridge        SL Hip ABD        Clamshell        Side-Stepping                                                                               Ther Act                                         Modalities             CP PRN

## 2022-10-17 ENCOUNTER — OFFICE VISIT (OUTPATIENT)
Dept: PHYSICAL THERAPY | Facility: CLINIC | Age: 72
End: 2022-10-17
Payer: COMMERCIAL

## 2022-10-17 DIAGNOSIS — M70.62 TROCHANTERIC BURSITIS, LEFT HIP: ICD-10-CM

## 2022-10-17 DIAGNOSIS — M25.552 LEFT HIP PAIN: Primary | ICD-10-CM

## 2022-10-17 PROCEDURE — 97140 MANUAL THERAPY 1/> REGIONS: CPT

## 2022-10-17 PROCEDURE — 97110 THERAPEUTIC EXERCISES: CPT

## 2022-10-17 PROCEDURE — 97112 NEUROMUSCULAR REEDUCATION: CPT

## 2022-10-17 NOTE — PROGRESS NOTES
Daily Note     Today's date: 10/17/2022  Patient name: Lizzy Urias  : 1950  MRN: [id number]  Referring provider: Poly Cam MD  Dx:   Encounter Diagnosis     ICD-10-CM    1  Left hip pain  M25 552    2  Trochanteric bursitis, left hip  M70 62                   Subjective: Patient reports, "I feel it when I walk"  Objective: See treatment diary below      Assessment: Tolerated treatment well  Patient would benefit from continued PT  Patient did well with her first PT session  She had increased discomfort with lateral hip raises and clamshells; held today due to pain  She had relief with IASTM and was able to tolerate wobbleboard and leg press without pain  Updated her HEP; demonstrates understanding  Will continue to work on hip strengthening as able  Plan: Continue per plan of care        Diagnosis:    Precautions:    Manuals 10/17        PROM        Mobs        IASTM Lateral hip/greater trochanter                        There Ex        Bike 3 mins        IT Band Stretch        Hamstring stretch  30" x 3        Leg Press 50# 2x10                        Neruo Re-Ed        SLR 2x10        Bridge        SL Hip ABD 10x        Clamshell Held- pain        Side-Stepping        wobbleboard  1 min ea way                                                                       Ther Act                                         Modalities             CP PRN

## 2022-10-20 ENCOUNTER — OFFICE VISIT (OUTPATIENT)
Dept: PHYSICAL THERAPY | Facility: CLINIC | Age: 72
End: 2022-10-20
Payer: COMMERCIAL

## 2022-10-20 DIAGNOSIS — M70.62 TROCHANTERIC BURSITIS, LEFT HIP: ICD-10-CM

## 2022-10-20 DIAGNOSIS — M25.552 LEFT HIP PAIN: Primary | ICD-10-CM

## 2022-10-20 PROCEDURE — 97110 THERAPEUTIC EXERCISES: CPT | Performed by: PHYSICAL THERAPIST

## 2022-10-20 PROCEDURE — 97112 NEUROMUSCULAR REEDUCATION: CPT | Performed by: PHYSICAL THERAPIST

## 2022-10-20 PROCEDURE — 97140 MANUAL THERAPY 1/> REGIONS: CPT | Performed by: PHYSICAL THERAPIST

## 2022-10-20 NOTE — PROGRESS NOTES
Daily Note     Today's date: 10/20/2022  Patient name: Odette Mott  : 1950  MRN: [id number]  Referring provider: Thania Ku MD  Dx:   Encounter Diagnosis     ICD-10-CM    1  Left hip pain  M25 552    2  Trochanteric bursitis, left hip  M70 62                   Subjective: "It felt OK after last visit"       Objective: See treatment diary below      Assessment: Tolerated treatment well  Patient would benefit from continued PT  Patient noted decreased pain with lateral hip mobs this morning  Still notes some difficulty with PREs, but will continue to work on these going forward  Progress as able  Plan: Progress treatment as tolerated         Diagnosis:    Precautions:    Manuals 10/17  10/20      PROM        Mobs  Lateral Hp Mobs       IASTM Lateral hip/greater trochanter                        There Ex        Bike 3 mins        IT Band Stretch        Hamstring stretch  30" x 3  30" x 3      Leg Press 50# 2x10        LTR   10" x 5              Neruo Re-Ed        SLR 2x10        Bridge  2x10      SL Hip ABD 10x  10x      Clamshell Held- pain        Side-Stepping        wobbleboard  1 min ea way  1 min ea way                                                                      Ther Act                                         Modalities             CP PRN

## 2022-10-25 ENCOUNTER — OFFICE VISIT (OUTPATIENT)
Dept: PHYSICAL THERAPY | Facility: CLINIC | Age: 72
End: 2022-10-25
Payer: COMMERCIAL

## 2022-10-25 DIAGNOSIS — M25.552 LEFT HIP PAIN: Primary | ICD-10-CM

## 2022-10-25 DIAGNOSIS — M70.62 TROCHANTERIC BURSITIS, LEFT HIP: ICD-10-CM

## 2022-10-25 PROCEDURE — 97112 NEUROMUSCULAR REEDUCATION: CPT

## 2022-10-25 PROCEDURE — 97110 THERAPEUTIC EXERCISES: CPT | Performed by: PHYSICAL THERAPIST

## 2022-10-25 PROCEDURE — 97140 MANUAL THERAPY 1/> REGIONS: CPT | Performed by: PHYSICAL THERAPIST

## 2022-10-25 NOTE — PROGRESS NOTES
Daily Note     Today's date: 10/25/2022  Patient name: Baldemar Estrada  : 1950  MRN: [id number]  Referring provider: Wendy Shultz MD  Dx:   Encounter Diagnosis     ICD-10-CM    1  Left hip pain  M25 552    2  Trochanteric bursitis, left hip  M70 62                   Subjective: "The belt thing hurt, but then it helped a lot"       Objective: See treatment diary below      Assessment: Tolerated treatment well  Patient would benefit from continued PT  Patient noted decreased pain and increased ROM with lateral hip belt mobs after last visit  Able to tolerate increased PREs today  Doing well overall  Progress as able  Plan: Progress treatment as tolerated         Diagnosis:    Precautions:    Manuals 10/17  10/20 10/25     PROM        Mobs  Lateral Hp Mobs  Lateral Hip Mobs      IASTM Lateral hip/greater trochanter                        There Ex        Bike 3 mins        IT Band Stretch        Hamstring stretch  30" x 3  30" x 3 30" x 5     Leg Press 50# 2x10        LTR   10" x 5              Neruo Re-Ed        SLR 2x10        Bridge  2x10 2x12     SL Hip ABD 10x  10x 2x10     Clamshell Held- pain   SL 2x10     Side-Stepping   Yellow 4 laps      wobbleboard  1 min ea way  1 min ea way  30" x 2 lateral      Tap down    4" x10  6" x10                                                            Ther Act                                         Modalities             CP PRN

## 2022-11-02 ENCOUNTER — OFFICE VISIT (OUTPATIENT)
Dept: PHYSICAL THERAPY | Facility: CLINIC | Age: 72
End: 2022-11-02

## 2022-11-02 DIAGNOSIS — M25.552 LEFT HIP PAIN: Primary | ICD-10-CM

## 2022-11-02 DIAGNOSIS — M70.62 TROCHANTERIC BURSITIS, LEFT HIP: ICD-10-CM

## 2022-11-02 NOTE — PROGRESS NOTES
Daily Note     Today's date: 2022  Patient name: Diana Andino  : 1950  MRN: [id number]  Referring provider: Zainab Flores MD  Dx:   Encounter Diagnosis     ICD-10-CM    1  Left hip pain  M25 552    2  Trochanteric bursitis, left hip  M70 62                   Subjective: Patient reports, "I haven't done much over the last couple of days due to my cataract surgery  But, I was able to put my socks on today">       Objective: See treatment diary below      Assessment: Tolerated treatment well  Patient would benefit from continued PT  Patient did well with her session today  Able to progress exercises today without complaints of pain  She was challenged with SLB and wobbleboard activities  Muscular fatigue was achieved by the end of the session  Will continue to progress as able  Plan: Continue per plan of care        Diagnosis:    Precautions:    Manuals 10/17  10/20 10/25 11     PROM        Mobs  Lateral Hp Mobs  Lateral Hip Mobs  Deferred     IASTM Lateral hip/greater trochanter                        There Ex        Bike 3 mins        IT Band Stretch        Hamstring stretch  30" x 3  30" x 3 30" x 5 30" x 5     Leg Press 50# 2x10    70# 2x10     LTR   10" x 5              Neruo Re-Ed        SLR 2x10        Bridge  2x10 2x12 OSB 2x10     SL Hip ABD 10x  10x 2x10     Clamshell Held- pain   SL 2x10 SL Red 2x10     Side-Stepping   Yellow 4 laps  No band 3 laps     wobbleboard  1 min ea way  1 min ea way  30" x 2 lateral  1 min ea way     Tap down    4" x10  6" x10 6 in, 2x10     Hamstring Curls    OSB 2x10     SLB     Foam: 20" x 3                                           Ther Act                                         Modalities             CP PRN

## 2022-11-03 ENCOUNTER — OFFICE VISIT (OUTPATIENT)
Dept: PHYSICAL THERAPY | Facility: CLINIC | Age: 72
End: 2022-11-03

## 2022-11-03 DIAGNOSIS — M25.552 LEFT HIP PAIN: Primary | ICD-10-CM

## 2022-11-03 DIAGNOSIS — M70.62 TROCHANTERIC BURSITIS, LEFT HIP: ICD-10-CM

## 2022-11-03 NOTE — PROGRESS NOTES
Daily Note     Today's date: 11/3/2022  Patient name: Ike Crain  : 1950  MRN: [id number]  Referring provider: Lisa Pichardo MD  Dx:   Encounter Diagnosis     ICD-10-CM    1  Left hip pain  M25 552    2  Trochanteric bursitis, left hip  M70 62                   Subjective: Patient reports, "I felt a little nauseous after the leg press yesterday"  Objective: See treatment diary below      Assessment: Tolerated treatment well  Patient exhibited good technique with therapeutic exercises  Continued with outlined exercise program with good tolerance  Added new exercises with good tolerance  She fatigued with light resistances, but was able to complete with good form  Will continue to progress patient as able  Plan: Continue per plan of care        Diagnosis:    Precautions:    Manuals  10/20 10/25 11/2  11/3    PROM        Mobs  Lateral Hp Mobs  Lateral Hip Mobs  Deferred  Deferred    IASTM                        There Ex        Bike        IT Band Stretch        Hamstring stretch   30" x 3 30" x 5 30" x 5  20" x 5    Leg Press    70# 2x10     LTR   10" x 5              Neruo Re-Ed        SLR     0 5# 2x10    Bridge  2x10 2x12 OSB 2x10  2x12  OSB 10x    SL Hip ABD  10x 2x10  0 5# 2x10    Clamshell   SL 2x10 SL Red 2x10     Side-Stepping   Yellow 4 laps  No band 3 laps     wobbleboard   1 min ea way  30" x 2 lateral  1 min ea way  2 min e[address]    Tap down    4" x10  6" x10 6 in, 2x10     Hamstring Curls    OSB 2x10  OSB 2x10    SLB     Foam: 20" x 3    Rebounder      Plyotoss: green 20x     Step ups      8 in 10x    Mini Squats      2x10                  Ther Act                                        Modalities             CP PRN

## 2022-11-08 ENCOUNTER — OFFICE VISIT (OUTPATIENT)
Dept: PHYSICAL THERAPY | Facility: CLINIC | Age: 72
End: 2022-11-08

## 2022-11-08 DIAGNOSIS — M70.62 TROCHANTERIC BURSITIS, LEFT HIP: ICD-10-CM

## 2022-11-08 DIAGNOSIS — M25.552 LEFT HIP PAIN: Primary | ICD-10-CM

## 2022-11-08 NOTE — PROGRESS NOTES
Daily Note     Today's date: 2022  Patient name: Gerardo Gaviria  : 1950  MRN: [id number]  Referring provider: Sherice Salas MD  Dx:   Encounter Diagnosis     ICD-10-CM    1  Left hip pain  M25 552    2  Trochanteric bursitis, left hip  M70 62                   Subjective: Patient reports, "It's okay  The more I do, the more I hurt  It's more soreness"  Objective: See treatment diary below      Assessment: Tolerated treatment well  Patient would benefit from continued PT  Patient continues to do well with PT  She notes overall improvement with her strength since starting PT  Progressed resistances and repetitions with good tolerance  She does note having difficulty getting off the ground at home  Reviewed and discussed techniques and she was able to get off the floor without issue  Will continue to progress patient as able  Plan: Continue per plan of care        Diagnosis:    Precautions:    Manuals 11/8  10/25 11/2  11/3    PROM        Mobs   Lateral Hip Mobs  Deferred  Deferred    IASTM                        There Ex        Bike        IT Band Stretch        Hamstring stretch  20" x 5   30" x 5 30" x 5  20" x 5    Leg Press    70# 2x10     LTR                 Neruo Re-Ed        SLR 1# 2x10     0 5# 2x10    Bridge OSB 2x10   2x12 OSB 2x10  2x12  OSB 10x    SL Hip ABD 1# 2x10   2x10  0 5# 2x10    Clamshell   SL 2x10 SL Red 2x10     Side-Stepping Red 3 laps   Yellow 4 laps  No band 3 laps     wobbleboard  2 min ea way   30" x 2 lateral  1 min ea way  2 min ea way    Tap down  8 in , 2x10   4" x10  6" x10 6 in, 2x10     Hamstring Curls    OSB 2x10  OSB 2x10    SLB     Foam: 20" x 3    Rebounder  Plyotoss: SL  green 20x     Plyotoss: green 20x     Step ups      8 in 10x    Mini Squats      2x10                  Ther Act            Getting up from the floor   practice: x5                          Modalities             CP PRN

## 2022-11-15 ENCOUNTER — OFFICE VISIT (OUTPATIENT)
Dept: PHYSICAL THERAPY | Facility: CLINIC | Age: 72
End: 2022-11-15

## 2022-11-15 DIAGNOSIS — M25.552 LEFT HIP PAIN: Primary | ICD-10-CM

## 2022-11-15 DIAGNOSIS — M70.62 TROCHANTERIC BURSITIS, LEFT HIP: ICD-10-CM

## 2022-11-15 NOTE — PROGRESS NOTES
Daily Note     Today's date: 11/15/2022  Patient name: Theron Naranjo  : 1950  MRN: [id number]  Referring provider: Janine Burrell MD  Dx:   Encounter Diagnosis     ICD-10-CM    1  Left hip pain  M25 552    2  Trochanteric bursitis, left hip  M70 62                   Subjective: Patient reports, "I had the grandkids over the weekend, and I walked over 10, 000 steps  My hip was okay"   Objective: See treatment diary below      Assessment: Tolerated treatment well  Patient would benefit from continued PT  Patient continues to do well with her program  She notes improved mobility with less hip pain overall  Added in new exercises to challenge her balance and hip stability  She does note fatigue by the session  Continuing to work on balance and hip strengthening at future sessions  Patient to be re-evaluated at next session  Plan: Continue per plan of care        Diagnosis:    Precautions:    Manuals 11/8 11/15   11/2  11/3    PROM        Mobs    Deferred  Deferred    IASTM                        There Ex        Bike        IT Band Stretch        Hamstring stretch  20" x 5  20" x 5   30" x 5  20" x 5    Leg Press  50# 2x10   70# 2x10     LTR                 Neruo Re-Ed        SLR 1# 2x10  1# 2x10    0 5# 2x10    Bridge OSB 2x10  OSB 2x10  OSB 2x10  2x12  OSB 10x    SL Hip ABD 1# 2x10  1# 2x10    0 5# 2x10    Clamshell    SL Red 2x10     Side-Stepping Red 3 laps  Red 4 laps   No band 3 laps     wobbleboard  2 min ea way  2 min ea way   1 min ea way  2 min ea way    Tap down  8 in , 2x10  8 in 2x10 ea  6 in, 2x10     Hamstring Curls    OSB 2x10  OSB 2x10    SLB     Foam: 20" x 3    Rebounder  Plyotoss: SL  green 20x  Plyotoss: SL  green 20x    Plyotoss: green 20x     Step ups      8 in 10x    Mini Squats      2x10    Foam planks   Tandem walk 2 laps                    Ther Act           Getting up from the floor   practice: x5                        Modalities            CP PRN

## 2022-11-17 ENCOUNTER — APPOINTMENT (OUTPATIENT)
Dept: PHYSICAL THERAPY | Facility: CLINIC | Age: 72
End: 2022-11-17

## 2022-11-22 ENCOUNTER — EVALUATION (OUTPATIENT)
Dept: PHYSICAL THERAPY | Facility: CLINIC | Age: 72
End: 2022-11-22

## 2022-11-22 DIAGNOSIS — M70.62 TROCHANTERIC BURSITIS, LEFT HIP: ICD-10-CM

## 2022-11-22 DIAGNOSIS — M25.552 LEFT HIP PAIN: Primary | ICD-10-CM

## 2022-11-22 NOTE — PROGRESS NOTES
PT Discharge    Today's date: 2022  Patient name: Waylon Alston  : 1950  MRN: [id number]  Referring provider: Ernestine Holland MD  Dx:   Encounter Diagnosis     ICD-10-CM    1  Left hip pain  M25 552       2  Trochanteric bursitis, left hip  M70 62                      Assessment  Assessment details: Waylon Alston is a 67 y o  female who has been consistent with attending and participating in skilled PT sessions  The patient has been able to demonstrate increased hip strength as well as being able to note decreased hip pain  She has been very happy with her progress and has demonstrated independence with her HEP  She will discharge to her HEP at this time  If Sintia Storm were to need PT in the future, we would be happy to share in her care        Impairments: abnormal muscle firing, abnormal or restricted ROM, activity intolerance, impaired physical strength, lacks appropriate home exercise program, pain with function, poor posture  and poor body mechanics  Understanding of Dx/Px/POC: good   Prognosis: good    Goals  Impairment Goals  - Decrease pain by 50% in 4 weeks - MET  - Increase left flexibility by 25% in 4 weeks - MET  - Increase left lower extremity strength golbally to 4+/5 in 4 weeks - MET    Functional Goals  - Return to Prior Level of Function in 4 weeks - MET  - Patient will be independent with HEP in 4 weeks - MET   - Patient will be able to stand with decreased pain after sitting for over 30 minutes in 4 weeks - MET  - Patient will be able to get off of the floor with decreased pain in 4 weeks - MET      Plan  Patient would benefit from: skilled physical therapy  Planned modality interventions: cryotherapy, thermotherapy: hydrocollator packs and TENS  Planned therapy interventions: home exercise program, graded exercise, functional ROM exercises, flexibility, body mechanics training, postural training, patient education, therapeutic activities, therapeutic exercise, manual therapy, joint mobilization and neuromuscular re-education  Frequency: 2x week  Duration in weeks: 4  Treatment plan discussed with: patient        Subjective Evaluation    History of Present Illness  Mechanism of injury: Patient reports that she has improved "85%" since starting PT  She notes that she has been very happy with her progress and has been keeping up with her HEP         HPI:  Patient notes that she has been having left hip pain starting ~2 years ago  She notes having discomfort after sitting for prolonged distances and getting off of the floor  She had been seeing Dr Nneka Coe where she was diagnosed with left hip OA and was referred to physical therapy  Pain Location:  L Lateral Hip   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 Functional Limitations: Independent prior   AGG:  Sitting for prolonged periods of time, sleeping in bed, crossing legs  Ease:  Medication, movement   Patient Goals:  "I want to move better"     Pain  Current pain ratin  At best pain ratin  At worst pain rating: 3  Quality: dull ache and pressure          Objective     Tenderness     Left Hip   Tenderness in the greater trochanter       Active Range of Motion     Additional Active Range of Motion Details  Lumbar Flexion - WNL  Lumbar Extension - WNL  Side bend - WNL   Decreased hip external/internal rotation on L compared to R     Strength/Myotome Testing     Left Hip   Planes of Motion   Flexion: 4+  Abduction: 4+    Right Hip   Normal muscle strength    Left Knee   Extension: 4+               Diagnosis:    Precautions:    Manuals 11/8 11/15  11/22 11/2  11/3    PROM        Mobs    Deferred  Deferred    IASTM                        There Ex        Bike        IT Band Stretch        Hamstring stretch  20" x 5  20" x 5   30" x 5  20" x 5    Leg Press  50# 2x10   70# 2x10     LTR         Patient Education    RT, D/C     Arieuo Re-Ed        SLR 1# 2x10  1# 2x10    0 5# 2x10    Bridge OSB 2x10  OSB 2x10  OSB 2x10  2x12  OSB 10x SL Hip ABD 1# 2x10  1# 2x10    0 5# 2x10    Clamshell    SL Red 2x10     Side-Stepping Red 3 laps  Red 4 laps   No band 3 laps     wobbleboard  2 min ea way  2 min ea way   1 min ea way  2 min ea way    Tap down  8 in , 2x10  8 in 2x10 ea  6 in, 2x10     Hamstring Curls    OSB 2x10  OSB 2x10    SLB     Foam: 20" x 3    Rebounder  Plyotoss: SL  green 20x  Plyotoss: SL  green 20x    Plyotoss: green 20x     Step ups      8 in 10x    Mini Squats      2x10    Foam planks   Tandem walk 2 laps                    Ther Act           Getting up from the floor   practice: x5                        Modalities            CP PRN

## 2022-11-23 ENCOUNTER — APPOINTMENT (OUTPATIENT)
Dept: PHYSICAL THERAPY | Facility: CLINIC | Age: 72
End: 2022-11-23

## 2022-11-25 ENCOUNTER — APPOINTMENT (OUTPATIENT)
Dept: PHYSICAL THERAPY | Facility: CLINIC | Age: 72
End: 2022-11-25

## 2022-11-29 ENCOUNTER — APPOINTMENT (OUTPATIENT)
Dept: PHYSICAL THERAPY | Facility: CLINIC | Age: 72
End: 2022-11-29

## 2022-12-01 ENCOUNTER — ANNUAL EXAM (OUTPATIENT)
Dept: OBGYN CLINIC | Facility: CLINIC | Age: 72
End: 2022-12-01

## 2022-12-01 VITALS
HEIGHT: 62 IN | WEIGHT: 118 LBS | BODY MASS INDEX: 21.71 KG/M2 | DIASTOLIC BLOOD PRESSURE: 72 MMHG | SYSTOLIC BLOOD PRESSURE: 138 MMHG

## 2022-12-01 DIAGNOSIS — Z01.419 ENCOUNTER FOR GYNECOLOGICAL EXAMINATION WITHOUT ABNORMAL FINDING: Primary | ICD-10-CM

## 2022-12-01 DIAGNOSIS — D25.9 UTERINE LEIOMYOMA, UNSPECIFIED LOCATION: ICD-10-CM

## 2022-12-01 NOTE — PROGRESS NOTES
Michael Mackey is a 67 y o   female who presents for annual well woman exam   Patient reports she is referred by her oncologist Dr Saadia Quiros regarding recent pelvic ultrasound  2022 at [facility] she had an ultrasound that showed fluid and calcification in the uterine isthmus, hypoechoic irregular myoma verses lymphoma more suspicious of myoma  Patient has no bleeding and no cramping and had endometrial biopsy that was benign 1 year ago  Counseled reviewed discussed likely myoma would observe if patient has any bleeding or cramping would recommend further evaluation with repeat ultrasound and possible repeat endometrial biopsy otherwise would repeat ultrasound in 1 year  Patient is amenable to this plan  Patient reports no bleeding, spotting, or discharge  Patient reports No hot flashes/night sweats, No pain problems intercourse (they are dealing with some erectile dysfunction and usually use alternative means), Yes  vaginal dryness, sleeping so so, wakes early and does not easily get back to sleep but will nap later as desired  Current contraception: post menopausal status  History of abnormal Pap smear: no  Family history of uterine or ovarian cancer: no  Regular self breast exam: yes  History of abnormal mammogram: no  Family history of breast cancer: no    Menstrual History:  OB History        2    Para   2    Term                AB        Living           SAB        IAB        Ectopic        Multiple        Live Births                    Patient's last menstrual period was 1998 (approximate)         The following portions of the patient's history were reviewed and updated as appropriate: allergies, current medications, past family history, past medical history, past social history, past surgical history and problem list   Past Medical History:   Diagnosis Date   • Anxiety    • Arthritis    • Bladder tumor    • Cancer ([facility] [address] ) 2017    Lymphoma   • Colon cancer [facility])     Non Hodgkin lymphoma bladder, colon, stomach 2017   • Colon polyp    • IBS (irritable bowel syndrome)    • Kidney stone several since the late    • Non Hodgkin's lymphoma ([facility] [address] ) 2017   • Renal calculi    • Urinary tract infection a few   • Vasculitis of skin      Past Surgical History:   Procedure Laterality Date   • APPENDECTOMY     • BREAST BIOPSY     •  SECTION      X2   • COLONOSCOPY     • CYSTOSCOPY W/ DILATION OF BLADDER  2017   • CYSTOSCOPY W/ RETROGRADES      2870,6067   • CYSTOSCOPY W/ URETERAL STENT PLACEMENT      3339,6194   • CYSTOSCOPY W/ URETEROSCOPY         • MASS EXCISION      in bladder due to lymphoma   • SMALL INTESTINE SURGERY      resection   • TONSILLECTOMY     • US GUIDED VASCULAR ACCESS  2017     OB History        2    Para   2    Term                AB        Living           SAB        IAB        Ectopic        Multiple        Live Births                     Review of Systems  Review of Systems   Constitutional: Positive for fatigue  Negative for fever and unexpected weight change  HENT: Negative for dental problem, sinus pressure and sinus pain  Eyes: Negative for visual disturbance  Respiratory: Negative for cough, shortness of breath and wheezing  Cardiovascular: Negative for chest pain and leg swelling  Gastrointestinal: Negative for blood in stool, constipation, diarrhea, nausea and vomiting  Endocrine: Negative for cold intolerance, heat intolerance and polydipsia  Genitourinary: Negative for dysuria, frequency, hematuria, menstrual problem and pelvic pain  Musculoskeletal: Negative for arthralgias and back pain  Neurological: Negative for dizziness, seizures and headaches  Psychiatric/Behavioral: The patient is not nervous/anxious          Objective      Ht 5' 2" (1 575 m)   Wt 53 5 kg (118 lb)   LMP 1998 (Approximate)   BMI 21 58 kg/m²   Vitals:    22 1106   Weight: 53 5 kg (118 lb)   Height: 5' 2" (1 575 m)     Vitals:    22 1106   BP: 138/72   BP Location: Right arm   Patient Position: Sitting   Weight: 53 5 kg (118 lb)   Height: 5' 2" (1 575 m)       General:   alert and oriented, in no acute distress   Heart: regular rate and rhythm, S1, S2 normal, no murmur, click, rub or gallop   Lungs: clear to auscultation bilaterally   Abdomen: soft, non-tender, without masses or organomegaly   Vulva: Normal, some atrophic changes and periclitoral lichen sclerosis   Vagina: normal mucosa, atrophic slightly narrow caliber   Cervix: no cervical motion tenderness and no lesions   Uterus: mobile, non-tender, Small   Adnexa: normal adnexa and no mass, fullness, tenderness   Breast inspection negative, no nipple discharge or bleeding, no masses or nodularity palpable  Rectal deferred had colonoscopy in January sees Dr Ryan Alvarado for colonoscopy every 2 years now  Thyroid normal no masses or nodules     Assessment   54-year-old  with history of lymphoma and some findings on ultrasound done in April  Patient with no symptoms and endometrial biopsy done last year  Stripe is 3 mm area is most consistent with old fibroid reviewed options for evaluation follow-up    Last mammogram 2022 normal     Plan   Repeat ultrasound in April patient to call sooner with any bleeding or cramping or unusual symptoms, given slip also for next mammogram   Return in 1 year or sooner as needed

## 2023-04-03 ENCOUNTER — HOSPITAL ENCOUNTER (OUTPATIENT)
Dept: ULTRASOUND IMAGING | Facility: HOSPITAL | Age: 73
Discharge: HOME | End: 2023-04-03

## 2023-04-03 DIAGNOSIS — D25.9 UTERINE LEIOMYOMA, UNSPECIFIED LOCATION: ICD-10-CM

## 2023-06-01 ENCOUNTER — OFFICE VISIT (OUTPATIENT)
Dept: FAMILY MEDICINE CLINIC | Facility: CLINIC | Age: 73
End: 2023-06-01

## 2023-06-01 VITALS
WEIGHT: 113 LBS | SYSTOLIC BLOOD PRESSURE: 126 MMHG | HEIGHT: 62 IN | DIASTOLIC BLOOD PRESSURE: 64 MMHG | OXYGEN SATURATION: 95 % | HEART RATE: 62 BPM | BODY MASS INDEX: 20.8 KG/M2 | TEMPERATURE: 98 F

## 2023-06-01 DIAGNOSIS — C83.38 DIFFUSE LARGE B-CELL LYMPHOMA OF LYMPH NODES OF MULTIPLE REGIONS (HCC): ICD-10-CM

## 2023-06-01 DIAGNOSIS — G93.32 CHRONIC FATIGUE SYNDROME: Primary | ICD-10-CM

## 2023-06-01 DIAGNOSIS — Z85.72 HISTORY OF B-CELL LYMPHOMA: ICD-10-CM

## 2023-06-01 DIAGNOSIS — F41.1 GENERALIZED ANXIETY DISORDER: ICD-10-CM

## 2023-06-01 RX ORDER — BROMFENAC SODIUM 0.7 MG/ML
SOLUTION/ DROPS OPHTHALMIC
COMMUNITY
Start: 2023-05-01

## 2023-06-01 RX ORDER — DIFLUPREDNATE OPHTHALMIC 0.5 MG/ML
EMULSION OPHTHALMIC
COMMUNITY
Start: 2023-05-08

## 2023-06-01 RX ORDER — SERTRALINE HYDROCHLORIDE 25 MG/1
25 TABLET, FILM COATED ORAL DAILY
Qty: 30 TABLET | Refills: 1 | Status: SHIPPED | OUTPATIENT
Start: 2023-06-01 | End: 2023-07-31

## 2023-06-01 NOTE — PROGRESS NOTES
Name: Kimberly Jain      : 1950      MRN: [id number]  Encounter Provider: KATY Zuñiga  Encounter Date: 2023   Encounter department: [address] [place] [address] MEDICINE    Assessment & Plan     1  Chronic fatigue syndrome  Assessment & Plan:  Pt reports worsening fatigue and nausea for the past few months  Tested for COVID multiple times and was negative  She has a hx of B-cell lymphoma last labs in 2022 were normal and has been in remission for 5 years  She is taking Vit D 1000 units, and Vit B12 3000 mcg  Will check labs CBC, CMP, TSH, Vit D3  Orders:  -     TSH, 3rd generation with Free T4 reflex; Future  -     CBC and differential; Future  -     Comprehensive metabolic panel; Future  -     Vitamin D 25 hydroxy; Future    2  Diffuse large B-cell lymphoma of lymph nodes of multiple regions [facility])  Assessment & Plan:  Pt states have been in remission for 5 years  3  History of B-cell lymphoma  -     CBC and differential; Future  -     Comprehensive metabolic panel; Future    4  Generalized anxiety disorder  Assessment & Plan:  INNA-7 score of 19 would like to restart sertraline  Discussed CBT she does not feel would be beneficial at this time  Reviewed medication side effects restart sertraline 25 mg and will re-eval in 4 weeks  Orders:  -     sertraline (ZOLOFT) 25 mg tablet; Take 1 tablet (25 mg total) by mouth daily      Depression Screening and Follow-up Plan: Patient was screened for depression during today's encounter  They screened negative with a PHQ-2 score of 2  Falls Plan of Care: balance, strength, and gait training instructions were provided  Vitamin D supplementation was recommended  Subjective      States that she has been feeling very tired with queasiness sometimes  She also reports being more anxious than normal with uncontrollable worrying  She was on sertraline but discontinued after chemotherapy   She has tested for COVID twice in the last two weeks and was negative  Educate nausea secondary to anxiety, will treat anxiety symptoms  Review of Systems   Constitutional: Negative for activity change, appetite change, chills, diaphoresis, fatigue, fever and unexpected weight change  HENT: Negative for congestion, ear pain, facial swelling, postnasal drip, sinus pressure, sinus pain, sneezing, sore throat and voice change  Eyes: Negative for pain, discharge and visual disturbance  Respiratory: Negative for cough, chest tightness, shortness of breath, wheezing and stridor  Cardiovascular: Negative for chest pain, palpitations and leg swelling  Gastrointestinal: Positive for nausea  Negative for abdominal distention, abdominal pain, blood in stool, constipation, diarrhea, rectal pain and vomiting  Endocrine: Negative for cold intolerance, heat intolerance and polyuria  Genitourinary: Negative for dysuria, frequency and hematuria  Musculoskeletal: Positive for arthralgias  Negative for back pain, joint swelling, myalgias, neck pain and neck stiffness  Skin: Negative for color change and rash  Allergic/Immunologic: Negative for environmental allergies and immunocompromised state  Neurological: Negative for dizziness, tremors, seizures, syncope, facial asymmetry, speech difficulty, weakness, light-headedness, numbness and headaches  Hematological: Negative for adenopathy  Does not bruise/bleed easily  Psychiatric/Behavioral: Positive for decreased concentration and sleep disturbance  Negative for agitation, behavioral problems, confusion, dysphoric mood, hallucinations, self-injury and suicidal ideas  The patient is nervous/anxious  The patient is not hyperactive  All other systems reviewed and are negative        Current Outpatient Medications on File Prior to Visit   Medication Sig   • cholecalciferol (VITAMIN D3) 1,000 units tablet Take 1,000 Units by mouth daily   • Cyanocobalamin (VITAMIN B-12) 3000 MCG/ML LIQD Place "under the tongue   • Difluprednate 0 05 % EMUL INSTILL 1 DROP BY OPTHALMIC ROUTE RIGHT EYE ONCE DAILY   • Multiple Vitamins-Minerals (CENTRUM SILVER 50+WOMEN PO) Take by mouth   • Polyethylene Glycol 3350 (MIRALAX PO) Take by mouth   • Prolensa 0 07 % SOLN INSTILL 1 DROP BY OPHTHALMIC ROUTE ONCE DAILY IN THE RIGHT EYE   • Turmeric 500 MG CAPS Take 2 capsules by mouth in the morning       Objective     /64 (BP Location: Left arm, Patient Position: Sitting, Cuff Size: Standard)   Pulse 62   Temp 98 °F (36 7 °C) (Tympanic)   Ht 5' 2\" (1 575 m)   Wt 51 3 kg (113 lb)   LMP 06/11/1998 (Approximate)   SpO2 95%   BMI 20 67 kg/m²     Physical Exam  Vitals and nursing note reviewed  Constitutional:       General: She is not in acute distress  Appearance: Normal appearance  She is normal weight  She is not ill-appearing or toxic-appearing  HENT:      Head: Normocephalic and atraumatic  Right Ear: Tympanic membrane, ear canal and external ear normal  There is no impacted cerumen  Left Ear: Tympanic membrane, ear canal and external ear normal  There is no impacted cerumen  Nose: Nose normal  No congestion or rhinorrhea  Mouth/Throat:      Mouth: Mucous membranes are moist       Pharynx: No oropharyngeal exudate or posterior oropharyngeal erythema  Eyes:      General:         Right eye: No discharge  Left eye: No discharge  Extraocular Movements: Extraocular movements intact  Conjunctiva/sclera: Conjunctivae normal       Pupils: Pupils are equal, round, and reactive to light  Neck:      Vascular: No carotid bruit  Cardiovascular:      Rate and Rhythm: Normal rate and regular rhythm  Pulses: Normal pulses  Heart sounds: Normal heart sounds  No murmur heard  Pulmonary:      Effort: Pulmonary effort is normal  No respiratory distress  Breath sounds: Normal breath sounds  No wheezing  Chest:      Chest wall: No tenderness     Abdominal:      General: " Bowel sounds are normal  There is no distension  Palpations: Abdomen is soft  There is no mass  Tenderness: There is no abdominal tenderness  There is no right CVA tenderness or left CVA tenderness  Musculoskeletal:         General: No swelling or tenderness  Normal range of motion  Cervical back: Normal range of motion  No rigidity or tenderness  Right lower leg: No edema  Left lower leg: No edema  Lymphadenopathy:      Head:      Right side of head: No submental, submandibular, tonsillar, preauricular, posterior auricular or occipital adenopathy  Left side of head: No submental, submandibular, tonsillar, preauricular, posterior auricular or occipital adenopathy  Cervical: No cervical adenopathy  Right cervical: No superficial or posterior cervical adenopathy  Left cervical: No superficial or posterior cervical adenopathy  Upper Body:      Right upper body: No supraclavicular adenopathy  Left upper body: No supraclavicular adenopathy  Skin:     General: Skin is warm  Capillary Refill: Capillary refill takes less than 2 seconds  Coloration: Skin is not jaundiced  Findings: No bruising, erythema or rash  Neurological:      General: No focal deficit present  Mental Status: She is alert and oriented to person, place, and time  Cranial Nerves: No cranial nerve deficit  Sensory: No sensory deficit  Coordination: Coordination normal    Psychiatric:         Attention and Perception: Attention and perception normal  She is attentive  She does not perceive auditory or visual hallucinations  Mood and Affect: Affect normal  Mood is anxious  Speech: Speech normal  She is communicative  Behavior: Behavior normal  Behavior is not agitated, slowed, aggressive, withdrawn, hyperactive or combative  Behavior is cooperative  Thought Content: Thought content normal  Thought content is not paranoid   Thought content does not include homicidal or suicidal ideation  Thought content does not include homicidal or suicidal plan           Cognition and Memory: Cognition and memory normal          Judgment: Judgment normal        KATY Monson

## 2023-06-01 NOTE — ASSESSMENT & PLAN NOTE
INNA-7 score of 19 would like to restart sertraline  Discussed CBT she does not feel would be beneficial at this time  Reviewed medication side effects restart sertraline 25 mg and will re-eval in 4 weeks

## 2023-06-01 NOTE — ASSESSMENT & PLAN NOTE
Pt reports worsening fatigue and nausea for the past few months  Tested for COVID multiple times and was negative  She has a hx of B-cell lymphoma last labs in July 2022 were normal and has been in remission for 5 years  She is taking Vit D 1000 units, and Vit B12 3000 mcg   Will check labs CBC, CMP, TSH, Vit D3

## 2023-06-07 ENCOUNTER — TELEPHONE (OUTPATIENT)
Dept: FAMILY MEDICINE CLINIC | Facility: CLINIC | Age: 73
End: 2023-06-07

## 2023-06-07 NOTE — TELEPHONE ENCOUNTER
Labs did not show any abnormalities  Severe anxiety can cause nausea and feeling of tiredness  We will work to control symptoms with prescribed medication also recommend rest

## 2023-06-07 NOTE — TELEPHONE ENCOUNTER
Rigoberto Sethi called LM on machine - says her lab results are in and wants to know what she should do since she's still experiencing the same symptoms she had since last visit      Rigoberto Sethi # [phone]

## 2023-06-20 ENCOUNTER — TELEPHONE (OUTPATIENT)
Dept: FAMILY MEDICINE CLINIC | Facility: CLINIC | Age: 73
End: 2023-06-20

## 2023-06-20 NOTE — TELEPHONE ENCOUNTER
"Olya rock/[employer] called  Josey Rosado had a \"PAD\" test & L foot result is \"severe\" and R foot is moderate  They have to report this to pcp when it's severe    "

## 2023-06-20 NOTE — TELEPHONE ENCOUNTER
Spoke with Sam Certain this was done in her home by [facility] we will receive this but not for a couple days

## 2023-06-29 ENCOUNTER — OFFICE VISIT (OUTPATIENT)
Dept: FAMILY MEDICINE CLINIC | Facility: CLINIC | Age: 73
End: 2023-06-29
Payer: COMMERCIAL

## 2023-06-29 VITALS
HEART RATE: 64 BPM | DIASTOLIC BLOOD PRESSURE: 60 MMHG | SYSTOLIC BLOOD PRESSURE: 124 MMHG | TEMPERATURE: 98.1 F | HEIGHT: 62 IN | OXYGEN SATURATION: 97 % | WEIGHT: 112.4 LBS | BODY MASS INDEX: 20.68 KG/M2

## 2023-06-29 DIAGNOSIS — I73.9 PAD (PERIPHERAL ARTERY DISEASE) (HCC): ICD-10-CM

## 2023-06-29 DIAGNOSIS — F41.1 GENERALIZED ANXIETY DISORDER: Primary | ICD-10-CM

## 2023-06-29 PROCEDURE — 99214 OFFICE O/P EST MOD 30 MIN: CPT

## 2023-06-29 RX ORDER — SERTRALINE HYDROCHLORIDE 25 MG/1
25 TABLET, FILM COATED ORAL DAILY
Qty: 90 TABLET | Refills: 1 | Status: SHIPPED | OUTPATIENT
Start: 2023-06-29 | End: 2023-12-26

## 2023-06-29 NOTE — PROGRESS NOTES
Name: Jc Jacobs      : 1950      MRN: [id number]  Encounter Provider: KATY Marsh  Encounter Date: 2023   Encounter department: [address] [place] [address] MEDICINE    Assessment & Plan     1  Generalized anxiety disorder  Assessment & Plan:  Pt reports improved symptoms on sertraline 25 mg  Will send 90 day supply to pharmacy  Orders:  -     sertraline (ZOLOFT) 25 mg tablet; Take 1 tablet (25 mg total) by mouth daily    2  PAD (peripheral artery disease) (HCC)  Assessment & Plan:  Pt had PAD testing at home with [facility] that showed severe LLE  and moderate RLE PAD  Pt denies any pain, numbness, skin discoloration or swelling in extremities  Vascular exam with normal pulse, no diminished sensation  Will refer to vascular surgery for follow-up  Orders:  -     Ambulatory Referral to Vascular Surgery; Future         Subjective      Pt presents for f/u for anxiety, pt is taking sertraline 25 mg ad reports current dose is effective  Pt had PAD testing done at home with [facility] that shows moderate RLE and severe LLE PAD, report copied and scanned to chart  Pt denies pain, numbness, discoloration or weakness of b/l lower extremities  Review of Systems   Constitutional: Negative for activity change, appetite change, chills, diaphoresis, fatigue and fever  HENT: Negative for congestion, drooling, ear pain, hearing loss, nosebleeds, postnasal drip, rhinorrhea, sinus pressure, sinus pain, sore throat, trouble swallowing and voice change  Eyes: Negative for photophobia, pain, discharge, redness and visual disturbance  Respiratory: Negative for cough, chest tightness, shortness of breath and wheezing  Cardiovascular: Negative for chest pain, palpitations and leg swelling  Gastrointestinal: Negative for abdominal distention, abdominal pain, blood in stool, constipation, diarrhea, nausea, rectal pain and vomiting     Endocrine: Negative for cold "intolerance, heat intolerance, polydipsia, polyphagia and polyuria  Genitourinary: Negative for decreased urine volume, difficulty urinating, dysuria, flank pain, genital sores, hematuria, menstrual problem, pelvic pain, urgency, vaginal discharge and vaginal pain  Musculoskeletal: Negative for arthralgias, back pain, gait problem, myalgias, neck pain and neck stiffness  Skin: Negative for color change, rash and wound  Allergic/Immunologic: Negative for environmental allergies, food allergies and immunocompromised state  Neurological: Negative for dizziness, tremors, seizures, syncope, facial asymmetry, weakness, light-headedness and numbness  Hematological: Negative for adenopathy  Psychiatric/Behavioral: Negative for agitation, behavioral problems, confusion, decreased concentration, dysphoric mood, hallucinations, self-injury, sleep disturbance and suicidal ideas  The patient is not nervous/anxious and is not hyperactive  All other systems reviewed and are negative        Current Outpatient Medications on File Prior to Visit   Medication Sig   • cholecalciferol (VITAMIN D3) 1,000 units tablet Take 1,000 Units by mouth daily   • Cyanocobalamin (VITAMIN B-12) 3000 MCG/ML LIQD Place under the tongue   • Difluprednate 0 05 % EMUL INSTILL 1 DROP BY OPTHALMIC ROUTE RIGHT EYE ONCE DAILY   • Multiple Vitamins-Minerals (CENTRUM SILVER 50+WOMEN PO) Take by mouth   • Polyethylene Glycol 3350 (MIRALAX PO) Take by mouth   • Prolensa 0 07 % SOLN INSTILL 1 DROP BY OPHTHALMIC ROUTE ONCE DAILY IN THE RIGHT EYE   • Turmeric 500 MG CAPS Take 2 capsules by mouth in the morning   • [DISCONTINUED] sertraline (ZOLOFT) 25 mg tablet Take 1 tablet (25 mg total) by mouth daily       Objective     /60 (BP Location: Right arm, Patient Position: Sitting, Cuff Size: Standard)   Pulse 64   Temp 98 1 °F (36 7 °C) (Tympanic)   Ht 5' 2\" (1 575 m)   Wt 51 kg (112 lb 6 4 oz)   LMP 06/11/1998 (Approximate)   SpO2 97%   " BMI 20 56 kg/m²     Physical Exam  Vitals and nursing note reviewed  Constitutional:       General: She is not in acute distress  Appearance: Normal appearance  She is normal weight  She is not ill-appearing or toxic-appearing  HENT:      Head: Normocephalic and atraumatic  Right Ear: Tympanic membrane, ear canal and external ear normal  There is no impacted cerumen  Left Ear: Tympanic membrane, ear canal and external ear normal  There is no impacted cerumen  Nose: Nose normal  No congestion or rhinorrhea  Mouth/Throat:      Mouth: Mucous membranes are moist       Pharynx: No oropharyngeal exudate or posterior oropharyngeal erythema  Eyes:      General: No scleral icterus  Right eye: No discharge  Left eye: No discharge  Extraocular Movements: Extraocular movements intact  Conjunctiva/sclera: Conjunctivae normal       Pupils: Pupils are equal, round, and reactive to light  Neck:      Vascular: No carotid bruit  Cardiovascular:      Rate and Rhythm: Normal rate and regular rhythm  Pulses: Normal pulses  Heart sounds: Normal heart sounds  No murmur heard  Pulmonary:      Effort: Pulmonary effort is normal  No respiratory distress  Breath sounds: Normal breath sounds  No wheezing  Chest:      Chest wall: No tenderness  Abdominal:      General: Bowel sounds are normal  There is no distension  Palpations: Abdomen is soft  There is no mass  Tenderness: There is no abdominal tenderness  There is no right CVA tenderness, left CVA tenderness or guarding  Musculoskeletal:         General: No swelling or tenderness  Normal range of motion  Cervical back: Normal range of motion  No rigidity or tenderness  Right upper leg: Normal       Left upper leg: Normal       Right knee: Normal       Left knee: Normal       Right lower leg: Normal  No swelling, deformity, lacerations, tenderness or bony tenderness  No edema        Left lower leg: Normal  No swelling, deformity, lacerations, tenderness or bony tenderness  No edema  Right ankle: Normal  No swelling, deformity, ecchymosis or lacerations  No tenderness  Normal range of motion  Anterior drawer test negative  Normal pulse  Right Achilles Tendon: Normal  No tenderness or defects  Sandoval's test negative  Left ankle: Normal  No swelling, deformity, ecchymosis or lacerations  No tenderness  Normal range of motion  Anterior drawer test negative  Normal pulse  Left Achilles Tendon: No tenderness or defects  Sandoval's test negative  Lymphadenopathy:      Cervical: No cervical adenopathy  Lower Body: No right inguinal adenopathy  No left inguinal adenopathy  Skin:     General: Skin is warm  Capillary Refill: Capillary refill takes less than 2 seconds  Coloration: Skin is not jaundiced  Findings: No bruising, erythema or rash  Neurological:      General: No focal deficit present  Mental Status: She is alert and oriented to person, place, and time  Cranial Nerves: Cranial nerves 2-12 are intact  No cranial nerve deficit  Sensory: Sensation is intact  No sensory deficit  Motor: Motor function is intact  No weakness, tremor, atrophy, abnormal muscle tone, seizure activity or pronator drift  Coordination: Coordination is intact  Romberg sign negative  Coordination normal       Gait: Gait is intact  Gait normal       Deep Tendon Reflexes: Reflexes are normal and symmetric  Reflex Scores:       Patellar reflexes are 2+ on the right side and 2+ on the left side  Achilles reflexes are 2+ on the right side and 2+ on the left side  Psychiatric:         Attention and Perception: Attention and perception normal  She does not perceive auditory or visual hallucinations  Mood and Affect: Mood and affect normal  Mood is not anxious  Speech: Speech normal          Behavior: Behavior normal  Behavior is not agitated  Behavior is cooperative  Thought Content: Thought content normal  Thought content is not paranoid or delusional  Thought content does not include homicidal or suicidal ideation  Thought content does not include homicidal or suicidal plan           Cognition and Memory: Cognition and memory normal          Judgment: Judgment normal        KATY Monson

## 2023-06-29 NOTE — ASSESSMENT & PLAN NOTE
Pt had PAD testing at home with [employer] that showed severe LLE  and moderate RLE PAD  Pt denies any pain, numbness, skin discoloration or swelling in extremities  Vascular exam with normal pulse, no diminished sensation  Will refer to vascular surgery for follow-up

## 2023-08-18 ENCOUNTER — CONSULT (OUTPATIENT)
Dept: VASCULAR SURGERY | Facility: CLINIC | Age: 73
End: 2023-08-18
Payer: COMMERCIAL

## 2023-08-18 VITALS
HEART RATE: 74 BPM | OXYGEN SATURATION: 97 % | WEIGHT: 111 LBS | SYSTOLIC BLOOD PRESSURE: 120 MMHG | BODY MASS INDEX: 20.43 KG/M2 | HEIGHT: 62 IN | DIASTOLIC BLOOD PRESSURE: 84 MMHG

## 2023-08-18 DIAGNOSIS — I73.9 ASYMPTOMATIC PERIPHERAL VASCULAR DISEASE (HCC): Primary | ICD-10-CM

## 2023-08-18 DIAGNOSIS — I73.9 PAD (PERIPHERAL ARTERY DISEASE) (HCC): ICD-10-CM

## 2023-08-18 PROCEDURE — 99203 OFFICE O/P NEW LOW 30 MIN: CPT

## 2023-08-18 NOTE — ASSESSMENT & PLAN NOTE
Patient is asymptomatic at this time. She denies any claudication, rest pain, numbness, color/temperature change, motor/sensory loss, or tissue loss. Patient reports occasional tingling sensation to posterior left knee, however she relates this to left hip bursitis. Patient reports isolated incident of vasculitis 8 years ago that manifested as petechial rash to fingers and shins/calves. She has had no complications since related to this vasculitis. Scanned in test results from [address] [place] [address] note DANIELLE 0.4 on right and 0.25 on left. Plan:  -We discussed the pathophysiology of peripheral arterial disease as well as contributing lifestyle factors.  -We discussed QuantaFlo results. I explained to the patient that these tests have a wide variability of accuracy. I also explained that her lack of symptoms indicates that she likely does not have peripheral arterial disease.  -We discussed symptoms of limb ischemia (claudication, rest pain, numbness, color/temperature change, motor/sensory loss, or tissue loss). -We will obtain baseline DANIELLE and waveform analysis to confirm adequate circulation and lack of peripheral arterial disease.  -Follow up in the office as needed pending results of testing.

## 2023-08-18 NOTE — PROGRESS NOTES
Assessment/Plan:    Asymptomatic peripheral vascular disease ([address])  Patient is asymptomatic at this time. She denies any claudication, rest pain, numbness, color/temperature change, motor/sensory loss, or tissue loss. Patient reports occasional tingling sensation to posterior left knee, however she relates this to left hip bursitis. Patient reports isolated incident of vasculitis 8 years ago that manifested as petechial rash to fingers and shins/calves. She has had no complications since related to this vasculitis. Scanned in test results from [address] [place] [address] note DANIELLE 0.4 on right and 0.25 on left. Plan:  -We discussed the pathophysiology of peripheral arterial disease as well as contributing lifestyle factors.  -We discussed QuantaFlo results. I explained to the patient that these tests have a wide variability of accuracy. I also explained that her lack of symptoms indicates that she likely does not have peripheral arterial disease.  -We discussed symptoms of limb ischemia (claudication, rest pain, numbness, color/temperature change, motor/sensory loss, or tissue loss). -We will obtain baseline DANIELLE and waveform analysis to confirm adequate circulation and lack of peripheral arterial disease.  -Follow up in the office as needed pending results of testing. Diagnoses and all orders for this visit:    Asymptomatic peripheral vascular disease ([address])  -     VAS DANIELLE & waveform analysis, multiple levels; Future    PAD (peripheral artery disease) ([address])  -     Ambulatory Referral to Vascular Surgery          Subjective:      Patient ID: Kam Ward is a 75 y.o. female. Patient is a 75year old female, nonsmoker, with PMH diffuse large B-cell lymphoma and vasculitis 8 years ago. Patient presents to the vascular surgery office upon referral by her PCP for the evaluation of possible peripheral arterial disease.      Patient reports that visiting nurse from [address] [place] [address] came to her home and completed QuantaFlo peripheral artery disease test on 6/20/23 that noted DANIELLE 0.4 on right side and 0.25 on left side. She reports that a pulse oximetry monitor was placed on her toe and "gave a low reading". Of note, patient reports that she had nail polish on at that time. Patient is asymptomatic at this time. She denies any claudication, rest pain, numbness, color/temperature change, motor/sensory loss, or tissue loss. Patient reports occasional tingling sensation to posterior left knee, however she relates this to left hip bursitis. Patient reports isolated incident of vasculitis 8 years ago that manifested as petechial rash to fingers and shins/calves. She has had no complications since related to this vasculitis. Patient is independent at her baseline. 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 The following portions of the patient's history were reviewed and updated as appropriate: allergies, current medications, past family history, past medical history, past social history, past surgical history and problem list.    Review of Systems   Constitutional: Negative for activity change. Respiratory: Negative for shortness of breath. Cardiovascular: Negative for chest pain and leg swelling. Skin: Negative for color change, pallor, rash and wound. Neurological: Positive for numbness (L posterior knee). All other systems reviewed and are negative. Objective:  /84 (BP Location: Left arm, Patient Position: Sitting, Cuff Size: Standard)   Pulse 74   Ht 5' 2" (1.575 m)   Wt 50.3 kg (111 lb)   LMP 06/11/1998 (Approximate)   SpO2 97%   BMI 20.30 kg/m²        Physical Exam  Vitals reviewed. Constitutional:       General: She is not in acute distress. Appearance: Normal appearance. HENT:      Head: Normocephalic and atraumatic. Cardiovascular:      Rate and Rhythm: Normal rate and regular rhythm.       Pulses:           Radial pulses are 2+ on the right side and 2+ on the left side. Popliteal pulses are 1+ on the right side and 1+ on the left side. Dorsalis pedis pulses are 1+ on the right side and 1+ on the left side. Posterior tibial pulses are 2+ on the right side and 2+ on the left side. Comments: Multiphasic doppler signals bilaterally  Pulmonary:      Effort: Pulmonary effort is normal. No respiratory distress. Musculoskeletal:         General: No swelling or tenderness. Cervical back: Normal range of motion and neck supple. Right lower leg: No edema. Left lower leg: No edema. Feet:      Right foot:      Skin integrity: Skin integrity normal.      Toenail Condition: Right toenails are normal.      Left foot:      Skin integrity: Skin integrity normal.      Toenail Condition: Left toenails are normal.      Comments: Lower extremities are warm and well perfused  Skin:     General: Skin is warm and dry. Capillary Refill: Capillary refill takes less than 2 seconds. Findings: No signs of injury, rash or wound. Neurological:      General: No focal deficit present. Mental Status: She is alert and oriented to person, place, and time. Psychiatric:         Mood and Affect: Mood normal.         Behavior: Behavior normal.       I have reviewed and made appropriate changes to the review of systems input by the medical assistant.     Vitals:    08/18/23 1458   BP: 120/84   BP Location: Left arm   Patient Position: Sitting   Cuff Size: Standard   Pulse: 74   SpO2: 97%   Weight: 50.3 kg (111 lb)   Height: 5' 2" (1.575 m)       Patient Active Problem List   Diagnosis   • Encounter for follow-up surveillance of diffuse large B-cell lymphoma   • Hx of monoclonal drug therapy   • Non-follicular (diffuse) lymphoma, unspecified, extranodal and solid organ sites [facility])   • Lichen sclerosus of female genitalia   • Memory problem   • Personal history of chemotherapy   • Fall   • Contusion of left chest wall   • Contusion of left hip   • Injury of left shoulder   • Generalized anxiety disorder   • Osteopenia of multiple sites   • History of B-cell lymphoma   • Mild ankle sprain, right, subsequent encounter   • Age-related cataract of both eyes   • Preop examination   • Diffuse large B-cell lymphoma of lymph nodes of multiple regions [facility])   • Chronic fatigue syndrome   • Asymptomatic peripheral vascular disease (HCC)       Past Surgical History:   Procedure Laterality Date   • APPENDECTOMY     • BREAST BIOPSY     •  SECTION      X2   • COLONOSCOPY     • CYSTOSCOPY W/ DILATION OF BLADDER     • CYSTOSCOPY W/ RETROGRADES      3763,7446   • CYSTOSCOPY W/ URETERAL STENT PLACEMENT      [id number]   • CYSTOSCOPY W/ URETEROSCOPY         • MASS EXCISION      in bladder due to lymphoma   • SMALL INTESTINE SURGERY      resection   • TONSILLECTOMY     • US GUIDED VASCULAR ACCESS  2017       Family History   Problem Relation Age of Onset   • Alzheimer's disease Mother    • Stomach cancer Father    • Cancer Father        Social History     Socioeconomic History   • Marital status: /Civil Union     Spouse name: Not on file   • Number of children: Not on file   • Years of education: Not on file   • Highest education level: Not on file   Occupational History   • Not on file   Tobacco Use   • Smoking status: Never   • Smokeless tobacco: Never   Vaping Use   • Vaping Use: Never used   Substance and Sexual Activity   • Alcohol use:  Yes     Alcohol/week: 0.0 standard drinks of alcohol     Comment: social drinker rare   • Drug use: No   • Sexual activity: Yes     Partners: Male     Birth control/protection: Post-menopausal     Comment: pt declines hiv std testing   Other Topics Concern   • Not on file   Social History Narrative   • Not on file     Social Determinants of Health     Financial Resource Strain: Low Risk  (2022)    Overall Financial Resource Strain (CARDIA)    • Difficulty of Paying Living Expenses: Not hard at all   Food Insecurity: Not on file   Transportation Needs: No Transportation Needs (9/8/2022)    PRAPARE - Transportation    • Lack of Transportation (Medical): No    • Lack of Transportation (Non-Medical): No   Physical Activity: Not on file   Stress: Not on file   Social Connections: Not on file   Intimate Partner Violence: Not on file   Housing Stability: Not on file       Allergies   Allergen Reactions   • Levofloxacin Hives   • Penicillins Hives and Rash         Current Outpatient Medications:   •  cholecalciferol (VITAMIN D3) 1,000 units tablet, Take 1,000 Units by mouth daily, Disp: , Rfl:   •  Cyanocobalamin (VITAMIN B-12) 3000 MCG/ML LIQD, Place under the tongue, Disp: , Rfl:   •  Difluprednate 0.05 % EMUL, INSTILL 1 DROP BY OPTHALMIC ROUTE RIGHT EYE ONCE DAILY, Disp: , Rfl:   •  Multiple Vitamins-Minerals (CENTRUM SILVER 50+WOMEN PO), Take by mouth, Disp: , Rfl:   •  Polyethylene Glycol 3350 (MIRALAX PO), Take by mouth, Disp: , Rfl:   •  Prolensa 0.07 % SOLN, INSTILL 1 DROP BY OPHTHALMIC ROUTE ONCE DAILY IN THE RIGHT EYE, Disp: , Rfl:   •  sertraline (ZOLOFT) 25 mg tablet, Take 1 tablet (25 mg total) by mouth daily, Disp: 90 tablet, Rfl: 1  •  Turmeric 500 MG CAPS, Take 2 capsules by mouth in the morning, Disp: , Rfl:     I have spent a total time of 30 minutes on 08/18/23 in caring for this patient including Diagnostic results, Prognosis, Risks and benefits of tx options, Instructions for management, Patient and family education, Importance of tx compliance, Risk factor reductions, Impressions, Counseling / Coordination of care, Documenting in the medical record, Reviewing / ordering tests, medicine, procedures   and Obtaining or reviewing history  .

## 2023-08-23 ENCOUNTER — HOSPITAL ENCOUNTER (OUTPATIENT)
Dept: VASCULAR ULTRASOUND | Facility: HOSPITAL | Age: 73
Discharge: HOME | End: 2023-08-23
Payer: COMMERCIAL

## 2023-08-23 DIAGNOSIS — I73.9 ASYMPTOMATIC PERIPHERAL VASCULAR DISEASE (HCC): ICD-10-CM

## 2023-08-23 PROCEDURE — 93923 UPR/LXTR ART STDY 3+ LVLS: CPT | Performed by: SURGERY

## 2023-08-23 PROCEDURE — 93923 UPR/LXTR ART STDY 3+ LVLS: CPT

## 2023-08-24 PROBLEM — I73.9: Status: RESOLVED | Noted: 2023-06-29 | Resolved: 2023-08-24

## 2023-09-06 ENCOUNTER — RA CDI HCC (OUTPATIENT)
Dept: OTHER | Facility: HOSPITAL | Age: 73
End: 2023-09-06

## 2023-09-12 ENCOUNTER — OFFICE VISIT (OUTPATIENT)
Dept: FAMILY MEDICINE CLINIC | Facility: CLINIC | Age: 73
End: 2023-09-12
Payer: COMMERCIAL

## 2023-09-12 VITALS
HEART RATE: 58 BPM | SYSTOLIC BLOOD PRESSURE: 122 MMHG | TEMPERATURE: 97.2 F | HEIGHT: 62 IN | WEIGHT: 113 LBS | OXYGEN SATURATION: 97 % | BODY MASS INDEX: 20.8 KG/M2 | DIASTOLIC BLOOD PRESSURE: 70 MMHG | RESPIRATION RATE: 16 BRPM

## 2023-09-12 DIAGNOSIS — L98.9 SKIN LESION: ICD-10-CM

## 2023-09-12 DIAGNOSIS — F41.1 GENERALIZED ANXIETY DISORDER: Primary | ICD-10-CM

## 2023-09-12 PROCEDURE — G0439 PPPS, SUBSEQ VISIT: HCPCS | Performed by: FAMILY MEDICINE

## 2023-09-12 NOTE — PATIENT INSTRUCTIONS
Medicare Preventive Visit Patient Instructions  Thank you for completing your Welcome to Medicare Visit or Medicare Annual Wellness Visit today. Your next wellness visit will be due in one year (9/12/2024). The screening/preventive services that you may require over the next 5-10 years are detailed below. Some tests may not apply to you based off risk factors and/or age. Screening tests ordered at today's visit but not completed yet may show as past due. Also, please note that scanned in results may not display below. Preventive Screenings:  Service Recommendations Previous Testing/Comments   Colorectal Cancer Screening  * Colonoscopy    * Fecal Occult Blood Test (FOBT)/Fecal Immunochemical Test (FIT)  * Fecal DNA/Cologuard Test  * Flexible Sigmoidoscopy Age: 43-73 years old   Colonoscopy: every 10 years (may be performed more frequently if at higher risk)  OR  FOBT/FIT: every 1 year  OR  Cologuard: every 3 years  OR  Sigmoidoscopy: every 5 years  Screening may be recommended earlier than age 39 if at higher risk for colorectal cancer. Also, an individualized decision between you and your healthcare provider will decide whether screening between the ages of 77-80 would be appropriate. Colonoscopy: 12/06/2021  FOBT/FIT: Not on file  Cologuard: Not on file  Sigmoidoscopy: Not on file    History Colorectal Cancer  Screening Current     Breast Cancer Screening Age: 36 years old  Frequency: every 1-2 years  Not required if history of left and right mastectomy Mammogram: 07/25/2022    Screening Current   Cervical Cancer Screening Between the ages of 21-29, pap smear recommended once every 3 years. Between the ages of 32-69, can perform pap smear with HPV co-testing every 5 years.    Recommendations may differ for women with a history of total hysterectomy, cervical cancer, or abnormal pap smears in past. Pap Smear: 12/01/2022    Screening Not Indicated   Hepatitis C Screening Once for adults born between 1945 and 1965  More frequently in patients at high risk for Hepatitis C Hep C Antibody: 09/14/2022    Screening Current   Diabetes Screening 1-2 times per year if you're at risk for diabetes or have pre-diabetes Fasting glucose: No results in last 5 years (No results in last 5 years)  A1C: No results in last 5 years (No results in last 5 years)  Risks and Benefits Discussed  Due for Blood Glucose   Cholesterol Screening Once every 5 years if you don't have a lipid disorder. May order more often based on risk factors. Lipid panel: 09/14/2022    History Lipid Disorder  Screening Current     Other Preventive Screenings Covered by Medicare:  1. Abdominal Aortic Aneurysm (AAA) Screening: covered once if your at risk. You're considered to be at risk if you have a family history of AAA. 2. Lung Cancer Screening: covers low dose CT scan once per year if you meet all of the following conditions: (1) Age 48-67; (2) No signs or symptoms of lung cancer; (3) Current smoker or have quit smoking within the last 15 years; (4) You have a tobacco smoking history of at least 20 pack years (packs per day multiplied by number of years you smoked); (5) You get a written order from a healthcare provider. 3. Glaucoma Screening: covered annually if you're considered high risk: (1) You have diabetes OR (2) Family history of glaucoma OR (3)  aged 48 and older OR (3)  American aged 72 and older  3. Osteoporosis Screening: covered every 2 years if you meet one of the following conditions: (1) You're estrogen deficient and at risk for osteoporosis based off medical history and other findings; (2) Have a vertebral abnormality; (3) On glucocorticoid therapy for more than 3 months; (4) Have primary hyperparathyroidism; (5) On osteoporosis medications and need to assess response to drug therapy. · Last bone density test (DXA Scan): 01/03/2022.  5. HIV Screening: covered annually if you're between the age of 15-65.  Also covered annually if you are younger than 13 and older than 72 with risk factors for HIV infection. For pregnant patients, it is covered up to 3 times per pregnancy. Immunizations:  Immunization Recommendations   Influenza Vaccine Annual influenza vaccination during flu season is recommended for all persons aged >= 6 months who do not have contraindications   Pneumococcal Vaccine   * Pneumococcal conjugate vaccine = PCV13 (Prevnar 13), PCV15 (Vaxneuvance), PCV20 (Prevnar 20)  * Pneumococcal polysaccharide vaccine = PPSV23 (Pneumovax) Adults 20-63 years old: 1-3 doses may be recommended based on certain risk factors  Adults 72 years old: 1-2 doses may be recommended based off what pneumonia vaccine you previously received   Hepatitis B Vaccine 3 dose series if at intermediate or high risk (ex: diabetes, end stage renal disease, liver disease)   Tetanus (Td) Vaccine - COST NOT COVERED BY MEDICARE PART B Following completion of primary series, a booster dose should be given every 10 years to maintain immunity against tetanus. Td may also be given as tetanus wound prophylaxis. Tdap Vaccine - COST NOT COVERED BY MEDICARE PART B Recommended at least once for all adults. For pregnant patients, recommended with each pregnancy. Shingles Vaccine (Shingrix) - COST NOT COVERED BY MEDICARE PART B  2 shot series recommended in those aged 48 and above     Health Maintenance Due:      Topic Date Due   • Breast Cancer Screening: Mammogram  07/25/2023   • Hepatitis C Screening  Completed   • Colorectal Cancer Screening  Discontinued     Immunizations Due:      Topic Date Due   • Pneumococcal Vaccine: 65+ Years (2 - PPSV23 if available, else PCV20) 10/17/2016   • COVID-19 Vaccine (4 - Booster for Pfizer series) 11/30/2021   • Influenza Vaccine (1) 09/01/2023     Advance Directives   What are advance directives? Advance directives are legal documents that state your wishes and plans for medical care.  These plans are made ahead of time in case you lose your ability to make decisions for yourself. Advance directives can apply to any medical decision, such as the treatments you want, and if you want to donate organs. What are the types of advance directives? There are many types of advance directives, and each state has rules about how to use them. You may choose a combination of any of the following:  · Living will: This is a written record of the treatment you want. You can also choose which treatments you do not want, which to limit, and which to stop at a certain time. This includes surgery, medicine, IV fluid, and tube feedings. · Durable power of  for healthcare [facility]): This is a written record that states who you want to make healthcare choices for you when you are unable to make them for yourself. This person, called a proxy, is usually a family member or a friend. You may choose more than 1 proxy. · Do not resuscitate (DNR) order:  A DNR order is used in case your heart stops beating or you stop breathing. It is a request not to have certain forms of treatment, such as CPR. A DNR order may be included in other types of advance directives. · Medical directive: This covers the care that you want if you are in a coma, near death, or unable to make decisions for yourself. You can list the treatments you want for each condition. Treatment may include pain medicine, surgery, blood transfusions, dialysis, IV or tube feedings, and a ventilator (breathing machine). · Values history: This document has questions about your views, beliefs, and how you feel and think about life. This information can help others choose the care that you would choose. Why are advance directives important? An advance directive helps you control your care. Although spoken wishes may be used, it is better to have your wishes written down. Spoken wishes can be misunderstood, or not followed. Treatments may be given even if you do not want them.  An advance directive may make it easier for your family to make difficult choices about your care. © Copyright [employer] 2018 Information is for End User's use only and may not be sold, redistributed or otherwise used for commercial purposes.  All illustrations and images included in CareNotes® are the copyrighted property of [employer]D.A[employer]M., Inc. or [address] [place] [address]

## 2023-09-12 NOTE — PROGRESS NOTES
Name: Greg Potts      : 1950      MRN: [id number]  Encounter Provider: Jessica Diallo MD  Encounter Date: 2023   Encounter department: [facility] [address] MEDICINE    Assessment & Plan     1. Generalized anxiety disorder  Assessment & Plan:  Pt doing well on sertraline       2. Skin lesion  Assessment & Plan:  Blanching red macule 1cm with central scab likely insect bite will monitor             Subjective      HPI pt here  For medicare wellness With complaints of red spot on back of left ankle  Mildly itchy  Review of Systems   Skin: Positive for color change (red spot back of left ankle for a few days ).        Current Outpatient Medications on File Prior to Visit   Medication Sig   • cholecalciferol (VITAMIN D3) 1,000 units tablet Take 1,000 Units by mouth daily   • Cyanocobalamin (VITAMIN B-12) 3000 MCG/ML LIQD Place under the tongue   • Difluprednate 0.05 % EMUL INSTILL 1 DROP BY OPTHALMIC ROUTE RIGHT EYE ONCE DAILY   • Multiple Vitamins-Minerals (CENTRUM SILVER 50+WOMEN PO) Take by mouth   • Polyethylene Glycol 3350 (MIRALAX PO) Take by mouth   • Prolensa 0.07 % SOLN INSTILL 1 DROP BY OPHTHALMIC ROUTE ONCE DAILY IN THE RIGHT EYE   • sertraline (ZOLOFT) 25 mg tablet Take 1 tablet (25 mg total) by mouth daily   • Turmeric 500 MG CAPS Take 2 capsules by mouth in the morning       Objective     /70 (BP Location: Left arm, Patient Position: Sitting, Cuff Size: Standard)   Pulse 58   Temp (!) 97.2 °F (36.2 °C) (Tympanic)   Resp 16   Ht 5' 2" (1.575 m)   Wt 51.3 kg (113 lb)   LMP 1998 (Approximate)   SpO2 97%   BMI 20.67 kg/m²     Physical Exam  Skin:     Comments: 1 cm erythematous macule  blanching small central scab       Jessica Diallo MD  Answers for HPI/ROS submitted by the patient on 2023  How would you rate your overall health?: good  Compared to last year, how is your physical health?: same  In general, how satisfied are you with your life?: satisfied  Compared to last year, how is your eyesight?: slightly worse  Compared to last year, how is your hearing?: same  Compared to last year, how is your emotional/mental health?: same  How often is anger a problem for you?: never, rarely  How often do you feel unusually tired/fatigued?: sometimes  In the past 7 days, how much pain have you experienced?: some  If you answered "some" or "a lot", please rate the severity of your pain on a scale of 1 to 10 (1 being the least severe pain and 10 being the most intense pain). : 5/10  In the past 6 months, have you lost or gained 10 pounds without trying?: No  One or more falls in the last year: Yes  In the past 6 months, have you accidentally leaked urine?: No  Do you have trouble with the stairs inside or outside your home?: No  Does your home have working smoke alarms?: Yes  Does your home have a carbon monoxide monitor?: Yes  Which safety hazards (if any) have you experienced in your home? Please select all that apply.: none  How would you describe your current diet?  Please select all that apply.: Regular  In addition to prescription medications, are you taking any over-the-counter supplements?: Yes  If yes, what supplements are you taking?: Tumeric, Vitamin B12, Vitamin D  Can you manage your medications?: Yes  Are you currently taking any opioid medications?: No  Can you walk and transfer into and out of your bed and chair?: Yes  Can you dress and groom yourself?: Yes  Can you bathe or shower yourself?: Yes  Can you feed yourself?: Yes  Can you do your laundry/ housekeeping?: Yes  Can you manage your money, pay your bills, and track your expenses?: Yes  Can you make your own meals?: Yes  Can you do your own shopping?: Yes  Within the last 12 months, have you had any hospitalizations or Emergency Department visits?: No  Do you have a living will?: Yes  Do you have a Durable POA (Power of ) for healthcare decisions?: Yes  Do you have an Advanced Directive for end of life decisions?: Yes  How often have you used an illegal drug (including marijuana) or a prescription medication for non-medical reasons in the past year?: never  What is the typical number of drinks you consume in a day?: 0  What is the typical number of drinks you consume in a week?: 0  How often did you have a drink containing alcohol in the past year?: monthly or less  How many drinks did you have on a typical day  when you were drinking in the past year?: 0  How often did you have 6 or more drinks on one occasion in the past year?: never

## 2023-09-12 NOTE — PROGRESS NOTES
Assessment and Plan:     Problem List Items Addressed This Visit        Musculoskeletal and Integument    Skin lesion     Blanching red macule 1cm with central scab likely insect bite will monitor            Other    Generalized anxiety disorder - Primary     Pt doing well on sertraline              Preventive health issues were discussed with patient, and age appropriate screening tests were ordered as noted in patient's After Visit Summary. Personalized health advice and appropriate referrals for health education or preventive services given if needed, as noted in patient's After Visit Summary.      History of Present Illness:     Patient presents for a Medicare Wellness Visit    HPI   Patient Care Team:  Dylan Bateman MD as PCP - General (Family Medicine)     Review of Systems:     Review of Systems     Problem List:     Patient Active Problem List   Diagnosis   • Encounter for follow-up surveillance of diffuse large B-cell lymphoma   • Hx of monoclonal drug therapy   • Non-follicular (diffuse) lymphoma, unspecified, extranodal and solid organ sites [facility])   • Lichen sclerosus of female genitalia   • Memory problem   • Personal history of chemotherapy   • Fall   • Contusion of left chest wall   • Contusion of left hip   • Injury of left shoulder   • Generalized anxiety disorder   • Osteopenia of multiple sites   • History of B-cell lymphoma   • Mild ankle sprain, right, subsequent encounter   • Age-related cataract of both eyes   • Preop examination   • Diffuse large B-cell lymphoma of lymph nodes of multiple regions [facility])   • Chronic fatigue syndrome   • Skin lesion      Past Medical and Surgical History:     Past Medical History:   Diagnosis Date   • Allergic     see chart   • Anxiety    • Arthritis    • Bladder tumor    • Cancer ([address]) 2017    Lymphoma   • Colon cancer ([address])     Non Hodgkin lymphoma bladder, colon, stomach 2017   • Colon polyp    • IBS (irritable bowel syndrome)    • Inflammatory bowel disease 20 years ago   • Kidney stone several since the late    • Memory loss 1 year ago   • Non Hodgkin's lymphoma ([address]) 2017   • Renal calculi    • Urinary tract infection a few   • Vasculitis of skin    • Visual impairment     I wear glasses     Past Surgical History:   Procedure Laterality Date   • APPENDECTOMY     • BREAST BIOPSY     •  SECTION      X2   • COLON SURGERY     • COLONOSCOPY     • CYSTOSCOPY W/ DILATION OF BLADDER  2017   • CYSTOSCOPY W/ RETROGRADES      8124,8097   • CYSTOSCOPY W/ URETERAL STENT PLACEMENT      5060,5384   • CYSTOSCOPY W/ URETEROSCOPY         • LYMPH NODE BIOPSY     • MASS EXCISION      in bladder due to lymphoma   • SMALL INTESTINE SURGERY      resection   • TONSILLECTOMY     • US GUIDED VASCULAR ACCESS  2017      Family History:     Family History   Problem Relation Age of Onset   • Alzheimer's disease Mother    • Arthritis Mother    • Dementia Mother    • Stomach cancer Father    • Cancer Father    • Hearing loss Father       Social History:     Social History     Socioeconomic History   • Marital status: /Civil Union     Spouse name: None   • Number of children: None   • Years of education: None   • Highest education level: None   Occupational History   • None   Tobacco Use   • Smoking status: Never   • Smokeless tobacco: Never   Vaping Use   • Vaping Use: Never used   Substance and Sexual Activity   • Alcohol use: Yes     Comment: Rarely. ....  on special occassions   • Drug use: No   • Sexual activity: Yes     Partners: Male     Birth control/protection: Post-menopausal     Comment: pt declines hiv std testing   Other Topics Concern   • None   Social History Narrative   • None     Social Determinants of Health     Financial Resource Strain: Low Risk  (2023)    Overall Financial Resource Strain (CARDIA)    • Difficulty of Paying Living Expenses: Not hard at all   Food Insecurity: Not on file   Transportation Needs: Unknown (2023)    PRAPARE - Transportation    • Lack of Transportation (Medical): Not on file    • Lack of Transportation (Non-Medical): No   Physical Activity: Not on file   Stress: Not on file   Social Connections: Not on file   Intimate Partner Violence: Not on file   Housing Stability: Not on file      Medications and Allergies:     Current Outpatient Medications   Medication Sig Dispense Refill   • cholecalciferol (VITAMIN D3) 1,000 units tablet Take 1,000 Units by mouth daily     • Cyanocobalamin (VITAMIN B-12) 3000 MCG/ML LIQD Place under the tongue     • Difluprednate 0.05 % EMUL INSTILL 1 DROP BY OPTHALMIC ROUTE RIGHT EYE ONCE DAILY     • Multiple Vitamins-Minerals (CENTRUM SILVER 50+WOMEN PO) Take by mouth     • Polyethylene Glycol 3350 (MIRALAX PO) Take by mouth     • Prolensa 0.07 % SOLN INSTILL 1 DROP BY OPHTHALMIC ROUTE ONCE DAILY IN THE RIGHT EYE     • sertraline (ZOLOFT) 25 mg tablet Take 1 tablet (25 mg total) by mouth daily 90 tablet 1   • Turmeric 500 MG CAPS Take 2 capsules by mouth in the morning       No current facility-administered medications for this visit.      Allergies   Allergen Reactions   • Levofloxacin Hives   • Penicillins Hives and Rash      Immunizations:     Immunization History   Administered Date(s) Administered   • COVID-19 PFIZER VACCINE 0.3 ML IM 02/17/2021, 03/09/2021, 10/05/2021   • H1N1, All Formulations 01/08/2010   • INFLUENZA 09/07/2016, 11/06/2017, 10/22/2018   • Influenza Split High Dose Preservative Free IM 10/22/2018   • Influenza, seasonal, injectable 11/17/2021   • Pneumococcal Conjugate 13-Valent 08/22/2016   • Zoster Vaccine Recombinant 09/28/2020   • influenza, trivalent, adjuvanted 09/30/2019, 09/28/2020      Health Maintenance:         Topic Date Due   • Breast Cancer Screening: Mammogram  07/25/2023   • Hepatitis C Screening  Completed   • Colorectal Cancer Screening  Discontinued         Topic Date Due   • Pneumococcal Vaccine: 65+ Years (2 - PPSV23 if available, else PCV20) 10/17/2016   • COVID-19 Vaccine (4 - Booster for Pfizer series) 11/30/2021   • Influenza Vaccine (1) 09/01/2023      Medicare Screening Tests and Risk Assessments:     Christina Espinoza is here for her Subsequent Wellness visit. Health Risk Assessment:   Patient rates overall health as good. Patient feels that their physical health rating is same. Patient is satisfied with their life. Eyesight was rated as slightly worse. Hearing was rated as same. Patient feels that their emotional and mental health rating is same. Patients states they are never, rarely angry. Patient states they are sometimes unusually tired/fatigued. Pain experienced in the last 7 days has been some. Patient's pain rating has been 5/10. Patient states that she has experienced no weight loss or gain in last 6 months. Depression Screening:   PHQ-2 Score: 0      Fall Risk Screening: In the past year, patient has experienced: no history of falling in past year      Urinary Incontinence Screening:   Patient has not leaked urine accidently in the last six months. Home Safety:  Patient does not have trouble with stairs inside or outside of their home. Patient has working smoke alarms and has working carbon monoxide detector. Home safety hazards include: none. Nutrition:   Current diet is Regular. Medications:   Patient is currently taking over-the-counter supplements. OTC medications include: Tumeric, Vitamin B12, Vitamin D. Patient is able to manage medications. Activities of Daily Living (ADLs)/Instrumental Activities of Daily Living (IADLs):   Walk and transfer into and out of bed and chair?: Yes  Dress and groom yourself?: Yes    Bathe or shower yourself?: Yes    Feed yourself?  Yes  Do your laundry/housekeeping?: Yes  Manage your money, pay your bills and track your expenses?: Yes  Make your own meals?: Yes    Do your own shopping?: Yes    Previous Hospitalizations:   Any hospitalizations or ED visits within the last 12 months?: No Advance Care Planning:   Living will: Yes    Durable POA for healthcare: Yes    Advanced directive: Yes      Cognitive Screening:   Provider or family/friend/caregiver concerned regarding cognition?: No    PREVENTIVE SCREENINGS      Cardiovascular Screening:    General: History Lipid Disorder and Screening Current      Diabetes Screening:     General: Risks and Benefits Discussed    Due for: Blood Glucose      Colorectal Cancer Screening:     General: History Colorectal Cancer and Screening Current      Breast Cancer Screening:     General: Screening Current      Cervical Cancer Screening:    General: Screening Not Indicated      Osteoporosis Screening:    General: Screening Current      Abdominal Aortic Aneurysm (AAA) Screening:        General: Screening Not Indicated      Lung Cancer Screening:     General: Screening Not Indicated      Hepatitis C Screening:    General: Screening Current    Screening, Brief Intervention, and Referral to Treatment (SBIRT)    Screening  Typical number of drinks in a day: 0  Typical number of drinks in a week: 0  Interpretation: Low risk drinking behavior. AUDIT-C Screenin) How often did you have a drink containing alcohol in the past year? monthly or less  2) How many drinks did you have on a typical day when you were drinking in the past year? 1 to 2  3) How often did you have 6 or more drinks on one occasion in the past year? never    AUDIT-C Score: 1  Interpretation: Score 0-2 (female): Negative screen for alcohol misuse    Single Item Drug Screening:  How often have you used an illegal drug (including marijuana) or a prescription medication for non-medical reasons in the past year? never    Single Item Drug Screen Score: 0  Interpretation: Negative screen for possible drug use disorder    No results found.      Physical Exam:     /70 (BP Location: Left arm, Patient Position: Sitting, Cuff Size: Standard)   Pulse 58   Temp (!) 97.2 °F (36.2 °C) (Tympanic) Resp 16   Ht 5' 2" (1.575 m)   Wt 51.3 kg (113 lb)   LMP 06/11/1998 (Approximate)   SpO2 97%   BMI 20.67 kg/m²     Physical Exam     Maryana Gagnon MD

## 2023-09-13 ENCOUNTER — TELEPHONE (OUTPATIENT)
Dept: ADMINISTRATIVE | Facility: OTHER | Age: 73
End: 2023-09-13

## 2023-09-13 NOTE — TELEPHONE ENCOUNTER
Upon review of the In Basket request we were able to locate, review, and update the patient chart as requested for Mammogram.    Any additional questions or concerns should be emailed to the Practice Liaisons via the appropriate education email address, please do not reply via In Basket.     Thank you  Kalyan Majano

## 2023-09-13 NOTE — TELEPHONE ENCOUNTER
----- Message from Thania Rogers sent at 9/12/2023  1:37 PM EDT -----  Regarding: care gap request  09/12/23 1:37 PM    Hello, our patient attached above has had Mammogram completed/performed. Please assist in updating the patient chart by pulling the Care Everywhere (CE) document. The date of service is 07/31/23.      Thank you,  Laurie Evans  [facility]

## 2023-12-28 ENCOUNTER — OFFICE VISIT (OUTPATIENT)
Dept: GASTROENTEROLOGY | Facility: CLINIC | Age: 73
End: 2023-12-28
Payer: COMMERCIAL

## 2023-12-28 ENCOUNTER — TELEPHONE (OUTPATIENT)
Dept: GASTROENTEROLOGY | Facility: CLINIC | Age: 73
End: 2023-12-28

## 2023-12-28 VITALS
SYSTOLIC BLOOD PRESSURE: 149 MMHG | DIASTOLIC BLOOD PRESSURE: 80 MMHG | HEIGHT: 62 IN | HEART RATE: 64 BPM | BODY MASS INDEX: 19.88 KG/M2 | WEIGHT: 108 LBS

## 2023-12-28 DIAGNOSIS — C83.38 DIFFUSE LARGE B-CELL LYMPHOMA OF LYMPH NODES OF MULTIPLE REGIONS (HCC): Primary | ICD-10-CM

## 2023-12-28 DIAGNOSIS — Z85.00 HISTORY OF GI TRACT CANCER: ICD-10-CM

## 2023-12-28 DIAGNOSIS — K57.90 DIVERTICULAR DISEASE: ICD-10-CM

## 2023-12-28 PROCEDURE — 99214 OFFICE O/P EST MOD 30 MIN: CPT | Performed by: INTERNAL MEDICINE

## 2023-12-28 RX ORDER — POLYETHYLENE GLYCOL 3350, SODIUM SULFATE ANHYDROUS, SODIUM BICARBONATE, SODIUM CHLORIDE, POTASSIUM CHLORIDE 236; 22.74; 6.74; 5.86; 2.97 G/4L; G/4L; G/4L; G/4L; G/4L
4 POWDER, FOR SOLUTION ORAL ONCE
Qty: 4000 ML | Refills: 0 | Status: SHIPPED | OUTPATIENT
Start: 2023-12-28 | End: 2023-12-28

## 2023-12-28 NOTE — PROGRESS NOTES
[facility] [place] - Outpatient Follow-up Note  Ashleigh Encinas 73 y.o. female MRN: [id number]  Encounter: [id number]          ASSESSMENT AND PLAN:      1. Diffuse large B-cell lymphoma of lymph nodes of multiple regions (HCC)  -     Colonoscopy; Future; Expected date: 12/28/2023  -     EGD; Future; Expected date: 12/28/2023  -     polyethylene glycol (Golytely) 4000 mL solution; Take 4,000 mL by mouth once for 1 dose Take according to instructions given by the office for colonoscopy bowel prep.    2. Diverticular disease    3. History of GI tract cancer  -     EGD; Future; Expected date: 12/28/2023    Patient with history of diffuse large B-cell lymphoma of multiple regions including gastric mass cecal mass bladder mass, in remission at this time for almost 6 years, will schedule EGD colonoscopy as recommended by oncologist for surveillance.  Procedure and prep discussed at length, she understands and agrees.    Dr. Hardy medical oncology note reviewed.    ______________________________________________________________________    SUBJECTIVE:      Patient came in for follow-up of her history of diffuse large B-cell lymphoma, of the cecum, clinically doing well, has been cancer free for 6 to 7 years, CT imaging done January 2022 was unrevealing.  Sees Dr. Hardy regularly, was advised EGD colonoscopy for surveillance.  Appetite is fair weight stable, denies any dysphagia coughing choking spells, occasionally has rare pain indigestion bowels are regular with fiber supplement.  Denies any blood in stools melena hematochezia.  Diet medications more than 10 system reviewed, lost some weight intentionally the appetite is good.      REVIEW OF SYSTEMS IS OTHERWISE NEGATIVE.      Historical Information   Past Medical History:   Diagnosis Date   • Allergic     see chart   • Anxiety    • Arthritis    • Bladder tumor    • Cancer (HCC) 2017    Lymphoma   • Colon cancer (HCC)     Non Hodgkin lymphoma bladder,  "colon, stomach 2017   • Colon polyp    • IBS (irritable bowel syndrome)    • Inflammatory bowel disease 20 years ago   • Kidney stone several since the late    • Memory loss 1 year ago   • Non Hodgkin's lymphoma (HCC) 2017   • Renal calculi    • Urinary tract infection a few   • Vasculitis of skin    • Visual impairment     I wear glasses     Past Surgical History:   Procedure Laterality Date   • APPENDECTOMY     • BREAST BIOPSY     •  SECTION      X2   • COLON SURGERY     • COLONOSCOPY     • CYSTOSCOPY W/ DILATION OF BLADDER     • CYSTOSCOPY W/ RETROGRADES         • CYSTOSCOPY W/ URETERAL STENT PLACEMENT         • CYSTOSCOPY W/ URETEROSCOPY         • LYMPH NODE BIOPSY     • MASS EXCISION      in bladder due to lymphoma   • SMALL INTESTINE SURGERY      resection   • TONSILLECTOMY     • US GUIDED VASCULAR ACCESS  2017     Social History   Social History     Substance and Sexual Activity   Alcohol Use Yes    Comment: Rarely..... on special occassions     Social History     Substance and Sexual Activity   Drug Use No     Social History     Tobacco Use   Smoking Status Never   Smokeless Tobacco Never     Family History   Problem Relation Age of Onset   • Alzheimer's disease Mother    • Arthritis Mother    • Dementia Mother    • Stomach cancer Father    • Cancer Father    • Hearing loss Father        Meds/Allergies       Current Outpatient Medications:   •  cholecalciferol (VITAMIN D3) 1,000 units tablet  •  Cyanocobalamin (VITAMIN B-12) 3000 MCG/ML LIQD  •  Multiple Vitamins-Minerals (CENTRUM SILVER 50+WOMEN PO)  •  polyethylene glycol (Golytely) 4000 mL solution  •  Polyethylene Glycol 3350 (MIRALAX PO)  •  sertraline (ZOLOFT) 25 mg tablet  •  Turmeric 500 MG CAPS  •  Difluprednate 0.05 % EMUL  •  Prolensa 0.07 % SOLN    Allergies   Allergen Reactions   • Levofloxacin Hives   • Penicillins Hives and Rash           Objective     Blood pressure 149/80, pulse 64, height 5' 2\" " (1.575 m), weight 49 kg (108 lb), last menstrual period 06/11/1998. Body mass index is 19.75 kg/m².      PHYSICAL EXAM:      General Appearance:   Alert, cooperative, no distress   HEENT:   Normocephalic, atraumatic, anicteric.     Neck:  Supple, symmetrical, trachea midline   Lungs:   Clear to auscultation bilaterally; no rales, rhonchi or wheezing; respirations unlabored    Heart::   Regular rate and rhythm; no murmur.   Abdomen:   Soft, non-tender, non-distended; normal bowel sounds; no masses, no organomegaly    Genitalia:   Deferred    Rectal:   Deferred    Extremities:  No cyanosis, clubbing or edema    Skin:  No jaundice, rashes, or lesions    Lymph nodes:  No palpable cervical lymphadenopathy        Lab Results:   No visits with results within 1 Day(s) from this visit.   Latest known visit with results is:   Lab on 09/14/2022   Component Date Value   • Hepatitis C Ab 09/14/2022 Non-reactive    • Cholesterol 09/14/2022 207 (H)    • Triglycerides 09/14/2022 87    • HDL, Direct 09/14/2022 64    • LDL Calculated 09/14/2022 126 (H)    • Non-HDL-Chol (CHOL-HDL) 09/14/2022 143          Radiology Results:   No results found.

## 2023-12-28 NOTE — TELEPHONE ENCOUNTER
Scheduled date of EGD/colonoscopy (as of today):03/21/24  Physician performing EGD/colonoscopy:Dr. Bowers  Location of EGD/colonoscopy:[facility]  Desired bowel prep reviewed with patient:Iris  Instructions reviewed with patient by:lisbeth  Clearances:  n/a

## 2024-01-15 DIAGNOSIS — F41.1 GENERALIZED ANXIETY DISORDER: ICD-10-CM

## 2024-01-15 RX ORDER — SERTRALINE HYDROCHLORIDE 25 MG/1
25 TABLET, FILM COATED ORAL DAILY
Qty: 90 TABLET | Refills: 1 | Status: SHIPPED | OUTPATIENT
Start: 2024-01-15 | End: 2024-07-13

## 2024-01-24 DIAGNOSIS — Z00.6 ENCOUNTER FOR EXAMINATION FOR NORMAL COMPARISON OR CONTROL IN CLINICAL RESEARCH PROGRAM: ICD-10-CM

## 2024-01-25 ENCOUNTER — APPOINTMENT (OUTPATIENT)
Dept: LAB | Facility: CLINIC | Age: 74
End: 2024-01-25

## 2024-01-25 DIAGNOSIS — Z00.6 ENCOUNTER FOR EXAMINATION FOR NORMAL COMPARISON OR CONTROL IN CLINICAL RESEARCH PROGRAM: ICD-10-CM

## 2024-01-25 PROCEDURE — 36415 COLL VENOUS BLD VENIPUNCTURE: CPT

## 2024-02-05 ENCOUNTER — OFFICE VISIT (OUTPATIENT)
Dept: FAMILY MEDICINE CLINIC | Facility: CLINIC | Age: 74
End: 2024-02-05
Payer: COMMERCIAL

## 2024-02-05 VITALS
HEIGHT: 62 IN | TEMPERATURE: 97.6 F | OXYGEN SATURATION: 98 % | RESPIRATION RATE: 16 BRPM | HEART RATE: 61 BPM | WEIGHT: 112 LBS | BODY MASS INDEX: 20.61 KG/M2 | SYSTOLIC BLOOD PRESSURE: 140 MMHG | DIASTOLIC BLOOD PRESSURE: 60 MMHG

## 2024-02-05 DIAGNOSIS — M85.9 DISORDER OF BONE DENSITY AND STRUCTURE, UNSPECIFIED: ICD-10-CM

## 2024-02-05 DIAGNOSIS — R53.83 OTHER FATIGUE: ICD-10-CM

## 2024-02-05 DIAGNOSIS — D50.9 IRON DEFICIENCY ANEMIA, UNSPECIFIED IRON DEFICIENCY ANEMIA TYPE: ICD-10-CM

## 2024-02-05 DIAGNOSIS — F41.1 GENERALIZED ANXIETY DISORDER: ICD-10-CM

## 2024-02-05 DIAGNOSIS — R35.0 URINARY FREQUENCY: ICD-10-CM

## 2024-02-05 DIAGNOSIS — Z85.72 HISTORY OF B-CELL LYMPHOMA: Primary | ICD-10-CM

## 2024-02-05 DIAGNOSIS — E44.1 MILD PROTEIN-CALORIE MALNUTRITION (HCC): ICD-10-CM

## 2024-02-05 PROBLEM — G93.32 CHRONIC FATIGUE SYNDROME: Status: RESOLVED | Noted: 2023-06-01 | Resolved: 2024-02-05

## 2024-02-05 PROCEDURE — 99214 OFFICE O/P EST MOD 30 MIN: CPT | Performed by: INTERNAL MEDICINE

## 2024-02-05 NOTE — PROGRESS NOTES
Assessment/Plan:Not entirely sure of what is causing Ashleigh to feel fatigued and lousy-she has a hx of B cell lymphoma but that was treated awhile ago andshe is still followed by Dr Hardy Oncology-will go ahead and order labwork and see if anything comes up as abnormal, anemia, etc etc-don't think there's a need for a sleep study-she reports that her  says she does not snore , so, for now, we will order the labs and see what they show         Problem List Items Addressed This Visit       Generalized anxiety disorder    History of B-cell lymphoma - Primary    Mild protein-calorie malnutrition (HCC)     Other Visit Diagnoses       Other fatigue        Relevant Orders    CBC and differential    Comprehensive metabolic panel    TSH, 3rd generation    Iron Panel (Includes Ferritin, Iron Sat%, Iron, and TIBC)    Vitamin D 25 hydroxy    Iron deficiency anemia, unspecified iron deficiency anemia type        Relevant Orders    Iron Panel (Includes Ferritin, Iron Sat%, Iron, and TIBC)    Disorder of bone density and structure, unspecified        Relevant Orders    Vitamin D 25 hydroxy    Urinary frequency        Relevant Orders    Urinalysis with microscopic              Subjective:      Patient ID: Ashleigh Encinas is a 74 y.o. female.    Ashleigh here because she's been feeling fatigued, lacking energy since December really-denies fever, cough, chest pain, sob, bleeding, dysuria, joint pains, rash-has a hx of B cell lymphoma of the colon and bladder back in 2016 that was treated by way of colonic resection and chemotherapy-sees Dr Hardy at [facility], still sees her once a year-only meds she takes are calcium vitamin D supplement and zoloft for some mild depression, which she's been on .  Doesn't smoke, only drinks rarely on social occasions. Says she feels cold a lot too    Fatigue  Associated symptoms include fatigue and a sore throat. Pertinent negatives include no fever, headaches, nausea or vomiting.        The following portions of the patient's history were reviewed and updated as appropriate:   Past Medical History:  She has a past medical history of Allergic, Anxiety, Arthritis, Bladder tumor, Cancer (HCC) (2017), Colon cancer (HCC), Colon polyp, IBS (irritable bowel syndrome), Inflammatory bowel disease (20 years ago), Kidney stone (several since the late ), Memory loss (1 year ago), Non Hodgkin's lymphoma (HCC) (2017), Renal calculi, Urinary tract infection (a few), Vasculitis of skin, and Visual impairment.,  _______________________________________________________________________  Medical Problems:  does not have any pertinent problems on file.,  _______________________________________________________________________  Past Surgical History:   has a past surgical history that includes Tonsillectomy; Appendectomy;  section; Small intestine surgery; Mass excision; Colonoscopy; Breast biopsy; Cystoscopy; Cystoscopy w/ ureteral stent placement; Cystoscopy w/ dilation of bladder (); Cystoscopy w/ ureteroscopy; US guided vascular access (2017); Colon surgery; and Lymph node biopsy.,  _______________________________________________________________________  Family History:  family history includes Alzheimer's disease in her mother; Arthritis in her mother; Cancer in her father; Dementia in her mother; Hearing loss in her father; Stomach cancer in her father.,  _______________________________________________________________________  Social History:   reports that she has never smoked. She has never used smokeless tobacco. She reports current alcohol use. She reports that she does not use drugs.,  _______________________________________________________________________  Allergies:  is allergic to levofloxacin and penicillins..  _______________________________________________________________________  Current Outpatient Medications   Medication Sig Dispense Refill    cholecalciferol (VITAMIN D3)  "1,000 units tablet Take 1,000 Units by mouth daily      Cyanocobalamin (VITAMIN B-12) 3000 MCG/ML LIQD Place under the tongue      Multiple Vitamins-Minerals (CENTRUM SILVER 50+WOMEN PO) Take by mouth      Polyethylene Glycol 3350 (MIRALAX PO) Take by mouth      sertraline (ZOLOFT) 25 mg tablet TAKE 1 TABLET (25 MG TOTAL) BY MOUTH DAILY. 90 tablet 1    Turmeric 500 MG CAPS Take 2 capsules by mouth in the morning       No current facility-administered medications for this visit.     _______________________________________________________________________  Review of Systems   Constitutional:  Positive for fatigue. Negative for fever.   HENT:  Positive for sore throat.    Respiratory: Negative.     Cardiovascular: Negative.    Gastrointestinal: Negative.  Negative for nausea and vomiting.   Endocrine: Positive for cold intolerance.   Genitourinary: Negative.    Musculoskeletal: Negative.    Skin: Negative.    Neurological:  Negative for headaches.         Objective:  Vitals:    02/05/24 1527   BP: 140/60   BP Location: Left arm   Patient Position: Sitting   Cuff Size: Standard   Pulse: 61   Resp: 16   Temp: 97.6 °F (36.4 °C)   TempSrc: Tympanic   SpO2: 98%   Weight: 50.8 kg (112 lb)   Height: 5' 1.5\" (1.562 m)     Body mass index is 20.82 kg/m².     Physical Exam  Constitutional:       Appearance: Normal appearance.   HENT:      Right Ear: External ear normal.      Left Ear: External ear normal.      Nose: Nose normal.      Mouth/Throat:      Mouth: Mucous membranes are moist.   Eyes:      General: No scleral icterus.     Extraocular Movements: Extraocular movements intact.      Pupils: Pupils are equal, round, and reactive to light.   Cardiovascular:      Rate and Rhythm: Normal rate and regular rhythm.      Heart sounds: Normal heart sounds. No murmur heard.  Pulmonary:      Effort: Pulmonary effort is normal.      Breath sounds: Normal breath sounds. No wheezing.   Abdominal:      General: Abdomen is flat.      " Palpations: Abdomen is soft.   Musculoskeletal:         General: Normal range of motion.      Cervical back: Normal range of motion.   Lymphadenopathy:      Cervical: No cervical adenopathy.   Skin:     General: Skin is warm.      Findings: No bruising or rash.   Neurological:      General: No focal deficit present.      Mental Status: She is alert.   Psychiatric:         Mood and Affect: Mood normal.         Thought Content: Thought content normal.         Judgment: Judgment normal.

## 2024-02-06 ENCOUNTER — APPOINTMENT (OUTPATIENT)
Dept: LAB | Facility: CLINIC | Age: 74
End: 2024-02-06
Payer: COMMERCIAL

## 2024-02-06 DIAGNOSIS — G93.32 CHRONIC FATIGUE SYNDROME: ICD-10-CM

## 2024-02-06 DIAGNOSIS — Z85.72 HISTORY OF B-CELL LYMPHOMA: ICD-10-CM

## 2024-02-06 DIAGNOSIS — R53.83 OTHER FATIGUE: ICD-10-CM

## 2024-02-06 DIAGNOSIS — D50.9 IRON DEFICIENCY ANEMIA, UNSPECIFIED IRON DEFICIENCY ANEMIA TYPE: ICD-10-CM

## 2024-02-06 DIAGNOSIS — R35.0 URINARY FREQUENCY: ICD-10-CM

## 2024-02-06 DIAGNOSIS — M85.9 DISORDER OF BONE DENSITY AND STRUCTURE, UNSPECIFIED: ICD-10-CM

## 2024-02-06 LAB
25(OH)D3 SERPL-MCNC: 68.1 NG/ML (ref 30–100)
ALBUMIN SERPL BCP-MCNC: 4.1 G/DL (ref 3.5–5)
ALP SERPL-CCNC: 59 U/L (ref 34–104)
ALT SERPL W P-5'-P-CCNC: 27 U/L (ref 7–52)
ANION GAP SERPL CALCULATED.3IONS-SCNC: 9 MMOL/L
AST SERPL W P-5'-P-CCNC: 28 U/L (ref 13–39)
BACTERIA UR QL AUTO: ABNORMAL /HPF
BASOPHILS # BLD AUTO: 0.04 THOUSANDS/ÂΜL (ref 0–0.1)
BASOPHILS NFR BLD AUTO: 1 % (ref 0–1)
BILIRUB SERPL-MCNC: 0.68 MG/DL (ref 0.2–1)
BILIRUB UR QL STRIP: NEGATIVE
BUN SERPL-MCNC: 15 MG/DL (ref 5–25)
CALCIUM SERPL-MCNC: 9.9 MG/DL (ref 8.4–10.2)
CHLORIDE SERPL-SCNC: 100 MMOL/L (ref 96–108)
CLARITY UR: ABNORMAL
CO2 SERPL-SCNC: 31 MMOL/L (ref 21–32)
COLOR UR: ABNORMAL
CREAT SERPL-MCNC: 0.64 MG/DL (ref 0.6–1.3)
EOSINOPHIL # BLD AUTO: 0.08 THOUSAND/ÂΜL (ref 0–0.61)
EOSINOPHIL NFR BLD AUTO: 2 % (ref 0–6)
ERYTHROCYTE [DISTWIDTH] IN BLOOD BY AUTOMATED COUNT: 13.2 % (ref 11.6–15.1)
FERRITIN SERPL-MCNC: 88 NG/ML (ref 11–307)
GFR SERPL CREATININE-BSD FRML MDRD: 87 ML/MIN/1.73SQ M
GLUCOSE P FAST SERPL-MCNC: 83 MG/DL (ref 65–99)
GLUCOSE UR STRIP-MCNC: NEGATIVE MG/DL
HCT VFR BLD AUTO: 41.4 % (ref 34.8–46.1)
HGB BLD-MCNC: 13.2 G/DL (ref 11.5–15.4)
HGB UR QL STRIP.AUTO: NEGATIVE
IMM GRANULOCYTES # BLD AUTO: 0.01 THOUSAND/UL (ref 0–0.2)
IMM GRANULOCYTES NFR BLD AUTO: 0 % (ref 0–2)
IRON SATN MFR SERPL: 20 % (ref 15–50)
IRON SERPL-MCNC: 73 UG/DL (ref 50–212)
KETONES UR STRIP-MCNC: NEGATIVE MG/DL
LEUKOCYTE ESTERASE UR QL STRIP: ABNORMAL
LYMPHOCYTES # BLD AUTO: 1.57 THOUSANDS/ÂΜL (ref 0.6–4.47)
LYMPHOCYTES NFR BLD AUTO: 33 % (ref 14–44)
MCH RBC QN AUTO: 29 PG (ref 26.8–34.3)
MCHC RBC AUTO-ENTMCNC: 31.9 G/DL (ref 31.4–37.4)
MCV RBC AUTO: 91 FL (ref 82–98)
MONOCYTES # BLD AUTO: 0.35 THOUSAND/ÂΜL (ref 0.17–1.22)
MONOCYTES NFR BLD AUTO: 7 % (ref 4–12)
NEUTROPHILS # BLD AUTO: 2.69 THOUSANDS/ÂΜL (ref 1.85–7.62)
NEUTS SEG NFR BLD AUTO: 57 % (ref 43–75)
NITRITE UR QL STRIP: POSITIVE
NON-SQ EPI CELLS URNS QL MICRO: ABNORMAL /HPF
NRBC BLD AUTO-RTO: 0 /100 WBCS
PH UR STRIP.AUTO: 7 [PH]
PLATELET # BLD AUTO: 258 THOUSANDS/UL (ref 149–390)
PMV BLD AUTO: 9.7 FL (ref 8.9–12.7)
POTASSIUM SERPL-SCNC: 3.9 MMOL/L (ref 3.5–5.3)
PROT SERPL-MCNC: 7.4 G/DL (ref 6.4–8.4)
PROT UR STRIP-MCNC: ABNORMAL MG/DL
RBC # BLD AUTO: 4.55 MILLION/UL (ref 3.81–5.12)
RBC #/AREA URNS AUTO: ABNORMAL /HPF
SODIUM SERPL-SCNC: 140 MMOL/L (ref 135–147)
SP GR UR STRIP.AUTO: 1.01 (ref 1–1.03)
TIBC SERPL-MCNC: 360 UG/DL (ref 250–450)
TSH SERPL DL<=0.05 MIU/L-ACNC: 1.85 UIU/ML (ref 0.45–4.5)
UIBC SERPL-MCNC: 287 UG/DL (ref 155–355)
UROBILINOGEN UR STRIP-ACNC: <2 MG/DL
WBC # BLD AUTO: 4.74 THOUSAND/UL (ref 4.31–10.16)
WBC #/AREA URNS AUTO: ABNORMAL /HPF
WBC CLUMPS # UR AUTO: PRESENT /UL

## 2024-02-06 PROCEDURE — 84443 ASSAY THYROID STIM HORMONE: CPT

## 2024-02-06 PROCEDURE — 82306 VITAMIN D 25 HYDROXY: CPT

## 2024-02-06 PROCEDURE — 81001 URINALYSIS AUTO W/SCOPE: CPT

## 2024-02-06 PROCEDURE — 85025 COMPLETE CBC W/AUTO DIFF WBC: CPT

## 2024-02-06 PROCEDURE — 83550 IRON BINDING TEST: CPT

## 2024-02-06 PROCEDURE — 83540 ASSAY OF IRON: CPT

## 2024-02-06 PROCEDURE — 80053 COMPREHEN METABOLIC PANEL: CPT

## 2024-02-06 PROCEDURE — 36415 COLL VENOUS BLD VENIPUNCTURE: CPT

## 2024-02-06 PROCEDURE — 82728 ASSAY OF FERRITIN: CPT

## 2024-02-08 DIAGNOSIS — N39.0 URINARY TRACT INFECTION WITHOUT HEMATURIA, SITE UNSPECIFIED: Primary | ICD-10-CM

## 2024-02-08 RX ORDER — SULFAMETHOXAZOLE AND TRIMETHOPRIM 800; 160 MG/1; MG/1
1 TABLET ORAL 2 TIMES DAILY
Qty: 10 TABLET | Refills: 0 | Status: SHIPPED | OUTPATIENT
Start: 2024-02-08 | End: 2024-02-13

## 2024-02-08 NOTE — TELEPHONE ENCOUNTER
----- Message from Shannen Betancourt MD sent at 2/6/2024  1:21 PM EST -----  So far, Ashleigh's labs look ok but her urine looks like a Urinary tract infection-not sure if that's what is causing her feeling lousy, but I guess it's possible-we shoud probably treat this with Bactrim DS 1 po BID for 5 days but if we could get a urine culture added on that would be good

## 2024-02-22 LAB
APOB+LDLR+PCSK9 GENE MUT ANL BLD/T: NOT DETECTED
BRCA1+BRCA2 DEL+DUP + FULL MUT ANL BLD/T: NOT DETECTED
MLH1+MSH2+MSH6+PMS2 GN DEL+DUP+FUL M: NOT DETECTED

## 2024-03-07 ENCOUNTER — ANESTHESIA EVENT (OUTPATIENT)
Dept: ANESTHESIOLOGY | Facility: AMBULATORY SURGERY CENTER | Age: 74
End: 2024-03-07

## 2024-03-07 ENCOUNTER — ANESTHESIA (OUTPATIENT)
Dept: ANESTHESIOLOGY | Facility: AMBULATORY SURGERY CENTER | Age: 74
End: 2024-03-07

## 2024-03-14 ENCOUNTER — PATIENT MESSAGE (OUTPATIENT)
Dept: GASTROENTEROLOGY | Facility: CLINIC | Age: 74
End: 2024-03-14

## 2024-03-14 DIAGNOSIS — C83.38 DIFFUSE LARGE B-CELL LYMPHOMA OF LYMPH NODES OF MULTIPLE REGIONS (HCC): Primary | ICD-10-CM

## 2024-03-14 NOTE — TELEPHONE ENCOUNTER
Please send Golytely into pt's pharmacy for her procedures scheduled on 3/21/24. Thank you so much!

## 2024-03-19 ENCOUNTER — TELEPHONE (OUTPATIENT)
Dept: GASTROENTEROLOGY | Facility: AMBULATORY SURGERY CENTER | Age: 74
End: 2024-03-19

## 2024-03-21 ENCOUNTER — ANESTHESIA (OUTPATIENT)
Dept: GASTROENTEROLOGY | Facility: AMBULATORY SURGERY CENTER | Age: 74
End: 2024-03-21

## 2024-03-21 ENCOUNTER — HOSPITAL ENCOUNTER (OUTPATIENT)
Dept: GASTROENTEROLOGY | Facility: AMBULATORY SURGERY CENTER | Age: 74
Discharge: HOME | End: 2024-03-21
Attending: INTERNAL MEDICINE
Payer: COMMERCIAL

## 2024-03-21 ENCOUNTER — ANESTHESIA EVENT (OUTPATIENT)
Dept: GASTROENTEROLOGY | Facility: AMBULATORY SURGERY CENTER | Age: 74
End: 2024-03-21

## 2024-03-21 VITALS
DIASTOLIC BLOOD PRESSURE: 70 MMHG | RESPIRATION RATE: 18 BRPM | SYSTOLIC BLOOD PRESSURE: 128 MMHG | HEIGHT: 62 IN | TEMPERATURE: 97.2 F | OXYGEN SATURATION: 95 % | WEIGHT: 112 LBS | BODY MASS INDEX: 20.61 KG/M2 | HEART RATE: 56 BPM

## 2024-03-21 DIAGNOSIS — Z85.00 HISTORY OF GI TRACT CANCER: ICD-10-CM

## 2024-03-21 DIAGNOSIS — C83.38 DIFFUSE LARGE B-CELL LYMPHOMA OF LYMPH NODES OF MULTIPLE REGIONS (HCC): ICD-10-CM

## 2024-03-21 PROCEDURE — 43235 EGD DIAGNOSTIC BRUSH WASH: CPT | Performed by: INTERNAL MEDICINE

## 2024-03-21 PROCEDURE — 88305 TISSUE EXAM BY PATHOLOGIST: CPT | Performed by: PATHOLOGY

## 2024-03-21 PROCEDURE — 45380 COLONOSCOPY AND BIOPSY: CPT | Performed by: INTERNAL MEDICINE

## 2024-03-21 RX ORDER — LIDOCAINE HYDROCHLORIDE 10 MG/ML
0.5 INJECTION, SOLUTION EPIDURAL; INFILTRATION; INTRACAUDAL; PERINEURAL ONCE AS NEEDED
Status: CANCELLED | OUTPATIENT
Start: 2024-03-21

## 2024-03-21 RX ORDER — LIDOCAINE HYDROCHLORIDE 10 MG/ML
0.5 INJECTION, SOLUTION EPIDURAL; INFILTRATION; INTRACAUDAL; PERINEURAL ONCE AS NEEDED
Status: DISCONTINUED | OUTPATIENT
Start: 2024-03-21 | End: 2024-03-25 | Stop reason: HOSPADM

## 2024-03-21 RX ORDER — BROMFENAC SODIUM 0.81 MG/ML
SOLUTION/ DROPS OPHTHALMIC
COMMUNITY
Start: 2024-03-01

## 2024-03-21 RX ORDER — PROPOFOL 10 MG/ML
INJECTION, EMULSION INTRAVENOUS AS NEEDED
Status: DISCONTINUED | OUTPATIENT
Start: 2024-03-21 | End: 2024-03-22

## 2024-03-21 RX ORDER — DIFLUPREDNATE OPHTHALMIC 0.5 MG/ML
EMULSION OPHTHALMIC
COMMUNITY
Start: 2024-02-29

## 2024-03-21 RX ORDER — LIDOCAINE HYDROCHLORIDE 10 MG/ML
INJECTION, SOLUTION EPIDURAL; INFILTRATION; INTRACAUDAL; PERINEURAL AS NEEDED
Status: DISCONTINUED | OUTPATIENT
Start: 2024-03-21 | End: 2024-03-22

## 2024-03-21 RX ORDER — SODIUM CHLORIDE, SODIUM LACTATE, POTASSIUM CHLORIDE, CALCIUM CHLORIDE 600; 310; 30; 20 MG/100ML; MG/100ML; MG/100ML; MG/100ML
INJECTION, SOLUTION INTRAVENOUS CONTINUOUS PRN
Status: DISCONTINUED | OUTPATIENT
Start: 2024-03-21 | End: 2024-03-22

## 2024-03-21 RX ADMIN — PROPOFOL 130 MG: 10 INJECTION, EMULSION INTRAVENOUS at 09:48

## 2024-03-21 RX ADMIN — PROPOFOL 70 MG: 10 INJECTION, EMULSION INTRAVENOUS at 09:52

## 2024-03-21 RX ADMIN — PROPOFOL 50 MG: 10 INJECTION, EMULSION INTRAVENOUS at 10:04

## 2024-03-21 RX ADMIN — PROPOFOL 50 MG: 10 INJECTION, EMULSION INTRAVENOUS at 09:56

## 2024-03-21 RX ADMIN — LIDOCAINE HYDROCHLORIDE 100 MG: 10 INJECTION, SOLUTION EPIDURAL; INFILTRATION; INTRACAUDAL; PERINEURAL at 09:48

## 2024-03-21 RX ADMIN — SODIUM CHLORIDE, SODIUM LACTATE, POTASSIUM CHLORIDE, CALCIUM CHLORIDE: 600; 310; 30; 20 INJECTION, SOLUTION INTRAVENOUS at 09:44

## 2024-03-21 RX ADMIN — PROPOFOL 100 MG: 10 INJECTION, EMULSION INTRAVENOUS at 09:59

## 2024-03-21 NOTE — ANESTHESIA POSTPROCEDURE EVALUATION
Post-Op Assessment Note    CV Status:  Stable  Pain Score: 0    Pain management: adequate       Mental Status:  Alert and awake   Hydration Status:  Euvolemic   PONV Controlled:  Controlled   Airway Patency:  Patent     Post Op Vitals Reviewed: Yes    No anethesia notable event occurred.    Staff: CRNA               BP   100/54   Temp   98.0   Pulse  64   Resp   18   SpO2   100

## 2024-03-21 NOTE — ANESTHESIA PREPROCEDURE EVALUATION
Procedure:  COLONOSCOPY  EGD    Relevant Problems   HEMATOLOGY   (+) Diffuse large B-cell lymphoma of lymph nodes of multiple regions (HCC)   (+) Non-follicular (diffuse) lymphoma, unspecified, extranodal and solid organ sites (HCC)      NEURO/PSYCH   (+) Generalized anxiety disorder        Physical Exam    Airway    Mallampati score: II  TM Distance: >3 FB  Neck ROM: full     Dental   No notable dental hx     Cardiovascular  Rhythm: regular, Rate: normal, Cardiovascular exam normal    Pulmonary  Pulmonary exam normal Breath sounds clear to auscultation    Other Findings  post-pubertal.      Anesthesia Plan  ASA Score- 2     Anesthesia Type- IV sedation with anesthesia with ASA Monitors.         Additional Monitors:     Airway Plan:     Comment: Discussed risks/benefits, including medication reactions, awareness, aspiration, and serious/life threatening complications.    Plan to maintain native airway with IVGA, monitored with EtCO2.       Plan Factors-Exercise tolerance (METS): >4 METS.    Chart reviewed.    Patient summary reviewed.      Patient instructed to abstain from smoking on day of procedure. Patient did not smoke on day of surgery.            Induction- intravenous.    Postoperative Plan-     Informed Consent- Anesthetic plan and risks discussed with patient.  I personally reviewed this patient with the CRNA. Discussed and agreed on the Anesthesia Plan with the CRNA..

## 2024-03-21 NOTE — H&P
History and Physical - [facility]  Ashleigh Encinas 74 y.o. female MRN: [id number]                  HPI: Ashleigh Encinas is a 74 y.o. year old female who presents for history of GERD and B-cell lymphoma      REVIEW OF SYSTEMS: Per the HPI, and otherwise unremarkable.    Historical Information   Past Medical History:   Diagnosis Date    Allergic     see chart    Anxiety     Arthritis     Bladder tumor     Cancer (HCC) 2017    Lymphoma    Colon cancer (HCC)     Non Hodgkin lymphoma bladder, colon, stomach 2017    Colon polyp     IBS (irritable bowel syndrome)     Inflammatory bowel disease 20 years ago    Kidney stone several since the late     Memory loss 1 year ago    Non Hodgkin's lymphoma (HCC) 2017    Renal calculi     Urinary tract infection a few    Vasculitis of skin     Visual impairment     I wear glasses     Past Surgical History:   Procedure Laterality Date    APPENDECTOMY      BREAST BIOPSY       SECTION      X2    COLON SURGERY      COLONOSCOPY      CYSTOSCOPY W/ DILATION OF BLADDER  2017    CYSTOSCOPY W/ RETROGRADES      ,    CYSTOSCOPY W/ URETERAL STENT PLACEMENT      ,    CYSTOSCOPY W/ URETEROSCOPY      2014    LYMPH NODE BIOPSY      MASS EXCISION      in bladder due to lymphoma    SMALL INTESTINE SURGERY      resection    TONSILLECTOMY      US GUIDED VASCULAR ACCESS  2017     Social History   Social History     Substance and Sexual Activity   Alcohol Use Yes    Comment: Rarely..... on special occassions     Social History     Substance and Sexual Activity   Drug Use No     Social History     Tobacco Use   Smoking Status Never   Smokeless Tobacco Never     Family History   Problem Relation Age of Onset    Alzheimer's disease Mother     Arthritis Mother     Dementia Mother     Stomach cancer Father     Cancer Father         stomach cancer    Hearing loss Father        Meds/Allergies       Current Outpatient Medications:     bromfenac sodium 0.07 %  "SOLN    cholecalciferol (VITAMIN D3) 1,000 units tablet    Cyanocobalamin (VITAMIN B-12) 3000 MCG/ML LIQD    Difluprednate 0.05 % EMUL    Multiple Vitamins-Minerals (CENTRUM SILVER 50+WOMEN PO)    Polyethylene Glycol 3350 (MIRALAX PO)    sertraline (ZOLOFT) 25 mg tablet    Turmeric 500 MG CAPS    Current Facility-Administered Medications:     lidocaine (PF) (XYLOCAINE-MPF) 1 % injection 0.5 mL, 0.5 mL, Infiltration, Once PRN    Allergies   Allergen Reactions    Levofloxacin Hives    Penicillins Hives and Rash       Objective     /71   Pulse 63 Comment: nsr  Temp (!) 97.2 °F (36.2 °C) (Temporal)   Resp 18   Ht 5' 2\" (1.575 m)   Wt 50.8 kg (112 lb)   LMP 06/11/1998 (Approximate)   SpO2 99%   BMI 20.49 kg/m²       PHYSICAL EXAM    Gen: NAD  Head: NCAT  CV: RRR  CHEST: Clear  ABD: soft, NT/ND  EXT: no edema      ASSESSMENT/PLAN:  This is a 74 y.o. year old female here for EGD colonoscopy, and she is stable and optimized for her procedure.        "

## 2024-03-27 PROCEDURE — 88305 TISSUE EXAM BY PATHOLOGIST: CPT | Performed by: PATHOLOGY

## 2024-03-27 NOTE — RESULT ENCOUNTER NOTE
Please call the patient regarding her abnormal result. Has polyp, Follow up colonscopy in 3 years, because of personal history of non-Hodgkin lymphoma.  Follow up in my office as needed

## 2024-05-30 ENCOUNTER — OFFICE VISIT (OUTPATIENT)
Dept: FAMILY MEDICINE CLINIC | Facility: CLINIC | Age: 74
End: 2024-05-30
Payer: COMMERCIAL

## 2024-05-30 VITALS
TEMPERATURE: 97.5 F | SYSTOLIC BLOOD PRESSURE: 140 MMHG | HEIGHT: 62 IN | HEART RATE: 77 BPM | DIASTOLIC BLOOD PRESSURE: 70 MMHG | BODY MASS INDEX: 20.06 KG/M2 | OXYGEN SATURATION: 98 % | WEIGHT: 109 LBS | RESPIRATION RATE: 16 BRPM

## 2024-05-30 DIAGNOSIS — D51.8 OTHER VITAMIN B12 DEFICIENCY ANEMIAS: ICD-10-CM

## 2024-05-30 DIAGNOSIS — R31.9 URINARY TRACT INFECTION WITH HEMATURIA, SITE UNSPECIFIED: Primary | ICD-10-CM

## 2024-05-30 DIAGNOSIS — C83.38 DIFFUSE LARGE B-CELL LYMPHOMA OF LYMPH NODES OF MULTIPLE REGIONS (HCC): ICD-10-CM

## 2024-05-30 DIAGNOSIS — N39.0 URINARY TRACT INFECTION WITH HEMATURIA, SITE UNSPECIFIED: Primary | ICD-10-CM

## 2024-05-30 DIAGNOSIS — R53.83 OTHER FATIGUE: ICD-10-CM

## 2024-05-30 PROCEDURE — 99214 OFFICE O/P EST MOD 30 MIN: CPT | Performed by: INTERNAL MEDICINE

## 2024-05-30 PROCEDURE — G2211 COMPLEX E/M VISIT ADD ON: HCPCS | Performed by: INTERNAL MEDICINE

## 2024-05-30 NOTE — PROGRESS NOTES
Assessment/Plan:Not sure what is causing Ashleigh to feel so tired and fatigued-she states that she does not snore or sleep badly-will repeat u/a and culture as she had what appeared to be UTI last time-has hx of NHL of colon s/p resection and reanastamosis and bladder -followed by Dr Hardy yearly for this-will check B12 level and due to complaint of not being able to take a deep breath and history will get CXR too-she's been on low dose sertraline so we are going to wean her off of that and have her come back in 6 weeks for follow up-possibly the fatigue is from sertraline         Problem List Items Addressed This Visit       Diffuse large B-cell lymphoma of lymph nodes of multiple regions (HCC)    Relevant Orders    XR chest pa & lateral     Other Visit Diagnoses       Urinary tract infection with hematuria, site unspecified    -  Primary    Relevant Orders    Urinalysis with microscopic    Urine culture    US bladder    Other fatigue        Relevant Orders    Vitamin B12    XR chest pa & lateral    Other vitamin B12 deficiency anemias        Relevant Orders    Vitamin B12              Subjective:      Patient ID: Ashleigh Encinas is a 74 y.o. female.    Ashleigh here still complaining of fatigue, having to take naps-has a hx of NHL of the colon and bladder-was complaining of similar issue in March and we did a bunch of labwork, all of which were ok with the exception of urine with what looked like a UTI-was treated with Bactrim but had not done a test of cure as of yet-will give her repeat u/a and culture request today and also ordered b 12 level-says she's normally very energetic but not right now-feels tired-also says she has trouble getting a deep breath in    Fatigue  Associated symptoms include fatigue. Pertinent negatives include no chest pain or coughing.       The following portions of the patient's history were reviewed and updated as appropriate:   Past Medical History:  She has a past medical history  of Allergic, Anxiety, Arthritis, Bladder tumor, Cancer (HCC) (2017), Colon cancer (HCC), Colon polyp, IBS (irritable bowel syndrome), Inflammatory bowel disease (20 years ago), Kidney stone (several since the late ), Memory loss (1 year ago), Non Hodgkin's lymphoma (HCC) (2017), Renal calculi, Urinary tract infection (a few), Vasculitis of skin, and Visual impairment.,  _______________________________________________________________________  Medical Problems:  does not have any pertinent problems on file.,  _______________________________________________________________________  Past Surgical History:   has a past surgical history that includes Tonsillectomy; Appendectomy;  section; Small intestine surgery; Mass excision; Colonoscopy; Breast biopsy; Cystoscopy; Cystoscopy w/ ureteral stent placement; Cystoscopy w/ dilation of bladder (); Cystoscopy w/ ureteroscopy; US guided vascular access (2017); Colon surgery; and Lymph node biopsy.,  _______________________________________________________________________  Family History:  family history includes Alzheimer's disease in her mother; Arthritis in her mother; Cancer in her father; Dementia in her mother; Hearing loss in her father; Stomach cancer in her father.,  _______________________________________________________________________  Social History:   reports that she has never smoked. She has never used smokeless tobacco. She reports current alcohol use. She reports that she does not use drugs.,  _______________________________________________________________________  Allergies:  is allergic to levofloxacin and penicillins..  _______________________________________________________________________  Current Outpatient Medications   Medication Sig Dispense Refill    bromfenac sodium 0.07 % SOLN INSTILL 1 DROP BY OPHTHALMIC ROUTE ONCE DAILY IN THE RIGHT EYE      cholecalciferol (VITAMIN D3) 1,000 units tablet Take 1,000 Units by mouth daily       "Cyanocobalamin (VITAMIN B-12) 3000 MCG/ML LIQD Place under the tongue      Multiple Vitamins-Minerals (CENTRUM SILVER 50+WOMEN PO) Take by mouth      Polyethylene Glycol 3350 (MIRALAX PO) Take by mouth      Turmeric 500 MG CAPS Take 2 capsules by mouth in the morning       No current facility-administered medications for this visit.     _______________________________________________________________________  Review of Systems   Constitutional:  Positive for fatigue.   HENT: Negative.     Respiratory:  Positive for chest tightness. Negative for cough and shortness of breath.    Cardiovascular:  Negative for chest pain and leg swelling.   Gastrointestinal: Negative.    Musculoskeletal: Negative.    Skin: Negative.    Neurological: Negative.    Hematological: Negative.    Psychiatric/Behavioral: Negative.           Objective:  Vitals:    05/30/24 1142   BP: 140/70   BP Location: Left arm   Patient Position: Sitting   Cuff Size: Standard   Pulse: 77   Resp: 16   Temp: 97.5 °F (36.4 °C)   TempSrc: Tympanic   SpO2: 98%   Weight: 49.4 kg (109 lb)   Height: 5' 2\" (1.575 m)     Body mass index is 19.94 kg/m².     Physical Exam  Constitutional:       Appearance: Normal appearance.   HENT:      Head: Normocephalic and atraumatic.      Right Ear: External ear normal.      Left Ear: External ear normal.      Nose: Nose normal.      Mouth/Throat:      Mouth: Mucous membranes are moist.   Eyes:      Extraocular Movements: Extraocular movements intact.   Cardiovascular:      Rate and Rhythm: Normal rate and regular rhythm.      Heart sounds: Normal heart sounds.   Pulmonary:      Effort: Pulmonary effort is normal.      Breath sounds: Normal breath sounds.   Abdominal:      General: Abdomen is flat.      Palpations: Abdomen is soft.   Musculoskeletal:         General: Normal range of motion.      Cervical back: Normal range of motion and neck supple.   Lymphadenopathy:      Cervical: No cervical adenopathy.   Skin:     General: " Skin is warm.      Capillary Refill: Capillary refill takes less than 2 seconds.   Neurological:      General: No focal deficit present.      Mental Status: She is alert and oriented to person, place, and time.   Psychiatric:         Mood and Affect: Mood normal.         Behavior: Behavior normal.

## 2024-06-03 ENCOUNTER — APPOINTMENT (OUTPATIENT)
Dept: LAB | Facility: HOSPITAL | Age: 74
End: 2024-06-03
Attending: INTERNAL MEDICINE
Payer: COMMERCIAL

## 2024-06-03 ENCOUNTER — HOSPITAL ENCOUNTER (OUTPATIENT)
Dept: RADIOLOGY | Facility: HOSPITAL | Age: 74
Discharge: HOME | End: 2024-06-03
Payer: COMMERCIAL

## 2024-06-03 ENCOUNTER — APPOINTMENT (OUTPATIENT)
Dept: LAB | Facility: HOSPITAL | Age: 74
End: 2024-06-03
Payer: COMMERCIAL

## 2024-06-03 ENCOUNTER — HOSPITAL ENCOUNTER (OUTPATIENT)
Dept: ULTRASOUND IMAGING | Facility: HOSPITAL | Age: 74
Discharge: HOME | End: 2024-06-03
Payer: COMMERCIAL

## 2024-06-03 DIAGNOSIS — N39.0 URINARY TRACT INFECTION WITH HEMATURIA, SITE UNSPECIFIED: ICD-10-CM

## 2024-06-03 DIAGNOSIS — R53.83 OTHER FATIGUE: ICD-10-CM

## 2024-06-03 DIAGNOSIS — R31.9 URINARY TRACT INFECTION WITH HEMATURIA, SITE UNSPECIFIED: ICD-10-CM

## 2024-06-03 DIAGNOSIS — D51.8 OTHER VITAMIN B12 DEFICIENCY ANEMIAS: ICD-10-CM

## 2024-06-03 DIAGNOSIS — C83.38 DIFFUSE LARGE B-CELL LYMPHOMA OF LYMPH NODES OF MULTIPLE REGIONS (HCC): ICD-10-CM

## 2024-06-03 LAB
BACTERIA UR QL AUTO: ABNORMAL /HPF
BILIRUB UR QL STRIP: NEGATIVE
CLARITY UR: CLEAR
COLOR UR: YELLOW
GLUCOSE UR STRIP-MCNC: NEGATIVE MG/DL
HGB UR QL STRIP.AUTO: NEGATIVE
KETONES UR STRIP-MCNC: NEGATIVE MG/DL
LEUKOCYTE ESTERASE UR QL STRIP: ABNORMAL
NITRITE UR QL STRIP: NEGATIVE
NON-SQ EPI CELLS URNS QL MICRO: ABNORMAL /HPF
PH UR STRIP.AUTO: 6 [PH]
PROT UR STRIP-MCNC: NEGATIVE MG/DL
RBC #/AREA URNS AUTO: ABNORMAL /HPF
SP GR UR STRIP.AUTO: <=1.005 (ref 1–1.03)
UROBILINOGEN UR QL STRIP.AUTO: 0.2 E.U./DL
VIT B12 SERPL-MCNC: 1759 PG/ML (ref 180–914)
WBC #/AREA URNS AUTO: ABNORMAL /HPF

## 2024-06-03 PROCEDURE — 87186 SC STD MICRODIL/AGAR DIL: CPT

## 2024-06-03 PROCEDURE — 82607 VITAMIN B-12: CPT

## 2024-06-03 PROCEDURE — 81001 URINALYSIS AUTO W/SCOPE: CPT

## 2024-06-03 PROCEDURE — 87086 URINE CULTURE/COLONY COUNT: CPT

## 2024-06-03 PROCEDURE — 87077 CULTURE AEROBIC IDENTIFY: CPT

## 2024-06-03 PROCEDURE — 71046 X-RAY EXAM CHEST 2 VIEWS: CPT

## 2024-06-03 PROCEDURE — 76857 US EXAM PELVIC LIMITED: CPT

## 2024-06-03 PROCEDURE — 36415 COLL VENOUS BLD VENIPUNCTURE: CPT

## 2024-06-04 DIAGNOSIS — N39.0 URINARY TRACT INFECTION WITHOUT HEMATURIA, SITE UNSPECIFIED: Primary | ICD-10-CM

## 2024-06-04 RX ORDER — NITROFURANTOIN 25; 75 MG/1; MG/1
100 CAPSULE ORAL 2 TIMES DAILY
Qty: 10 CAPSULE | Refills: 0 | Status: SHIPPED | OUTPATIENT
Start: 2024-06-04 | End: 2024-06-09

## 2024-06-05 LAB — BACTERIA UR CULT: ABNORMAL

## 2024-07-02 ENCOUNTER — RA CDI HCC (OUTPATIENT)
Dept: OTHER | Facility: HOSPITAL | Age: 74
End: 2024-07-02

## 2024-07-11 ENCOUNTER — OFFICE VISIT (OUTPATIENT)
Dept: FAMILY MEDICINE CLINIC | Facility: CLINIC | Age: 74
End: 2024-07-11
Payer: COMMERCIAL

## 2024-07-11 VITALS
OXYGEN SATURATION: 97 % | BODY MASS INDEX: 20.61 KG/M2 | HEART RATE: 64 BPM | DIASTOLIC BLOOD PRESSURE: 70 MMHG | HEIGHT: 62 IN | SYSTOLIC BLOOD PRESSURE: 122 MMHG | RESPIRATION RATE: 16 BRPM | WEIGHT: 112 LBS

## 2024-07-11 DIAGNOSIS — R35.0 URINARY FREQUENCY: Primary | ICD-10-CM

## 2024-07-11 PROBLEM — Z01.818 PREOP EXAMINATION: Status: RESOLVED | Noted: 2022-09-08 | Resolved: 2024-07-11

## 2024-07-11 LAB
SL AMB  POCT GLUCOSE, UA: ABNORMAL
SL AMB LEUKOCYTE ESTERASE,UA: ABNORMAL
SL AMB POCT BILIRUBIN,UA: ABNORMAL
SL AMB POCT BLOOD,UA: ABNORMAL
SL AMB POCT CLARITY,UA: CLEAR
SL AMB POCT COLOR,UA: YELLOW
SL AMB POCT KETONES,UA: ABNORMAL
SL AMB POCT NITRITE,UA: ABNORMAL
SL AMB POCT PH,UA: 5
SL AMB POCT SPECIFIC GRAVITY,UA: 1.01
SL AMB POCT URINE PROTEIN: ABNORMAL
SL AMB POCT UROBILINOGEN: ABNORMAL

## 2024-07-11 PROCEDURE — 87186 SC STD MICRODIL/AGAR DIL: CPT | Performed by: FAMILY MEDICINE

## 2024-07-11 PROCEDURE — 99213 OFFICE O/P EST LOW 20 MIN: CPT | Performed by: FAMILY MEDICINE

## 2024-07-11 PROCEDURE — 87086 URINE CULTURE/COLONY COUNT: CPT | Performed by: FAMILY MEDICINE

## 2024-07-11 PROCEDURE — 87077 CULTURE AEROBIC IDENTIFY: CPT | Performed by: FAMILY MEDICINE

## 2024-07-11 PROCEDURE — 81002 URINALYSIS NONAUTO W/O SCOPE: CPT | Performed by: FAMILY MEDICINE

## 2024-07-11 NOTE — PROGRESS NOTES
Subjective: pt here with urinary frequency pt had UTI  e coli no fever no dysuria no back pain     Patient ID: Ashleigh Encinas is a 74 y.o. female.    HPI    Past Medical History:   Diagnosis Date   • Allergic     see chart   • Anxiety    • Arthritis    • Bladder tumor    • Cancer (HCC) 2017    Lymphoma   • Colon cancer (HCC)     Non Hodgkin lymphoma bladder, colon, stomach    • Colon polyp    • IBS (irritable bowel syndrome)    • Inflammatory bowel disease 20 years ago   • Kidney stone several since the late    • Memory loss 1 year ago   • Non Hodgkin's lymphoma (HCC) 2017   • Renal calculi    • Urinary tract infection a few   • Vasculitis of skin    • Visual impairment     I wear glasses       Family History   Problem Relation Age of Onset   • Alzheimer's disease Mother    • Arthritis Mother    • Dementia Mother    • Stomach cancer Father    • Cancer Father         stomach cancer   • Hearing loss Father        Past Surgical History:   Procedure Laterality Date   • APPENDECTOMY     • BREAST BIOPSY     •  SECTION      X2   • COLON SURGERY     • COLONOSCOPY     • CYSTOSCOPY W/ DILATION OF BLADDER     • CYSTOSCOPY W/ RETROGRADES      ,   • CYSTOSCOPY W/ URETERAL STENT PLACEMENT         • CYSTOSCOPY W/ URETEROSCOPY         • LYMPH NODE BIOPSY     • MASS EXCISION      in bladder due to lymphoma   • SMALL INTESTINE SURGERY      resection   • TONSILLECTOMY     • US GUIDED VASCULAR ACCESS  2017        reports that she has never smoked. She has never used smokeless tobacco. She reports current alcohol use. She reports that she does not use drugs.      Current Outpatient Medications:   •  bromfenac sodium 0.07 % SOLN, INSTILL 1 DROP BY OPHTHALMIC ROUTE ONCE DAILY IN THE RIGHT EYE, Disp: , Rfl:   •  cholecalciferol (VITAMIN D3) 1,000 units tablet, Take 1,000 Units by mouth daily, Disp: , Rfl:   •  Cyanocobalamin (VITAMIN B-12) 3000 MCG/ML LIQD, Place under the tongue,  "Disp: , Rfl:   •  Multiple Vitamins-Minerals (CENTRUM SILVER 50+WOMEN PO), Take by mouth, Disp: , Rfl:   •  Polyethylene Glycol 3350 (MIRALAX PO), Take by mouth, Disp: , Rfl:   •  Turmeric 500 MG CAPS, Take 2 capsules by mouth in the morning, Disp: , Rfl:     The following portions of the patient's history were reviewed and updated as appropriate: allergies, current medications, past family history, past medical history, past social history, past surgical history and problem list.    Review of Systems   Constitutional:  Negative for chills and fever.   Gastrointestinal:  Negative for abdominal pain, nausea and vomiting.   Genitourinary:  Positive for frequency. Negative for difficulty urinating, dysuria, enuresis, flank pain, hematuria and urgency.   Musculoskeletal:  Negative for back pain.         PHQ-2/9 Depression Screening    Little interest or pleasure in doing things: 0 - not at all  Feeling down, depressed, or hopeless: 0 - not at all  PHQ-2 Score: 0  PHQ-2 Interpretation: Negative depression screen             Objective:    /70 (BP Location: Left arm, Patient Position: Sitting, Cuff Size: Standard)   Pulse 64   Resp 16   Ht 5' 2\" (1.575 m)   Wt 50.8 kg (112 lb)   LMP 06/11/1998 (Approximate)   SpO2 97%   BMI 20.49 kg/m²      Physical Exam  Constitutional:       General: She is not in acute distress.     Appearance: Normal appearance. She is well-developed. She is not ill-appearing.   Cardiovascular:      Rate and Rhythm: Normal rate and regular rhythm.      Heart sounds: Normal heart sounds.   Pulmonary:      Effort: Pulmonary effort is normal.      Breath sounds: Normal breath sounds.   Abdominal:      General: Bowel sounds are normal. There is no distension.      Palpations: Abdomen is soft.      Tenderness: There is no abdominal tenderness. There is no right CVA tenderness, left CVA tenderness or guarding.      Comments: No CVA tenderness   Neurological:      Mental Status: She is alert and " oriented to person, place, and time.           Recent Results (from the past 8736 hour(s))   COMPREHENSIVE METABOLIC PANEL    Collection Time: 08/02/23  7:04 AM   Result Value Ref Range    Glucose 79 65 - 99 mg/dL    BUN 16 7 - 25 mg/dL    Creatinine 0.69 0.40 - 1.10 mg/dL    Sodium 139 135 - 145 mmol/L    Potassium 3.7 3.5 - 5.2 mmol/L    Chloride 100 100 - 109 mmol/L    Carbon Dioxide 32 (H) 21 - 31 mmol/L    Calcium 9.7 8.5 - 10.1 mg/dL    Alkaline Phosphatase 51 35 - 120 U/L    ALBUMIN 4.0 3.5 - 5.7 g/dL    Total Bilirubin 0.7 0.2 - 1.0 mg/dL    Protein, Total 6.7 6.3 - 8.3 g/dL    AST 19 <41 U/L    ALT 20 <56 U/L    ANION GAP 7 3 - 11    eGFRcr 91 >59    eGFR Comment Interpretive information: calculated GFR    LACTATE DEHYDROGENASE    Collection Time: 08/02/23  7:04 AM   Result Value Ref Range     (L) 140 - 271 U/L   [facility] Molecular Screen    Collection Time: 01/25/24 10:27 AM    Specimen: Arm, Right; Blood   Result Value Ref Range    MCCRAY SYNDROME DNA ANALYSIS Not Detected     HEREDITARY BREAST & OVARIAN CANCER DNA ANALYSIS Not Detected     FAMILIAL HYPERCHOLESTEROLEMIA DNA ANALYSIS Not Detected    TSH, 3rd generation with Free T4 reflex    Collection Time: 02/06/24  7:14 AM   Result Value Ref Range    TSH 3RD GENERATON 1.851 0.450 - 4.500 uIU/mL   CBC and differential    Collection Time: 02/06/24  7:14 AM   Result Value Ref Range    WBC 4.74 4.31 - 10.16 Thousand/uL    RBC 4.55 3.81 - 5.12 Million/uL    Hemoglobin 13.2 11.5 - 15.4 g/dL    Hematocrit 41.4 34.8 - 46.1 %    MCV 91 82 - 98 fL    MCH 29.0 26.8 - 34.3 pg    MCHC 31.9 31.4 - 37.4 g/dL    RDW 13.2 11.6 - 15.1 %    MPV 9.7 8.9 - 12.7 fL    Platelets 258 149 - 390 Thousands/uL    nRBC 0 /100 WBCs    Segmented % 57 43 - 75 %    Immature Grans % 0 0 - 2 %    Lymphocytes % 33 14 - 44 %    Monocytes % 7 4 - 12 %    Eosinophils Relative 2 0 - 6 %    Basophils Relative 1 0 - 1 %    Absolute Neutrophils 2.69 1.85 - 7.62 Thousands/µL    Absolute  Immature Grans 0.01 0.00 - 0.20 Thousand/uL    Absolute Lymphocytes 1.57 0.60 - 4.47 Thousands/µL    Absolute Monocytes 0.35 0.17 - 1.22 Thousand/µL    Eosinophils Absolute 0.08 0.00 - 0.61 Thousand/µL    Basophils Absolute 0.04 0.00 - 0.10 Thousands/µL   Comprehensive metabolic panel    Collection Time: 02/06/24  7:14 AM   Result Value Ref Range    Sodium 140 135 - 147 mmol/L    Potassium 3.9 3.5 - 5.3 mmol/L    Chloride 100 96 - 108 mmol/L    CO2 31 21 - 32 mmol/L    ANION GAP 9 mmol/L    BUN 15 5 - 25 mg/dL    Creatinine 0.64 0.60 - 1.30 mg/dL    Glucose, Fasting 83 65 - 99 mg/dL    Calcium 9.9 8.4 - 10.2 mg/dL    AST 28 13 - 39 U/L    ALT 27 7 - 52 U/L    Alkaline Phosphatase 59 34 - 104 U/L    Total Protein 7.4 6.4 - 8.4 g/dL    Albumin 4.1 3.5 - 5.0 g/dL    Total Bilirubin 0.68 0.20 - 1.00 mg/dL    eGFR 87 ml/min/1.73sq m   Vitamin D 25 hydroxy    Collection Time: 02/06/24  7:14 AM   Result Value Ref Range    Vit D, 25-Hydroxy 68.1 30.0 - 100.0 ng/mL   Urinalysis with microscopic    Collection Time: 02/06/24  7:14 AM   Result Value Ref Range    Color, UA Light Yellow     Clarity, UA Turbid     Specific Gravity, UA 1.014 1.003 - 1.030    pH, UA 7.0 4.5, 5.0, 5.5, 6.0, 6.5, 7.0, 7.5, 8.0    Leukocytes, UA Large (A) Negative    Nitrite, UA Positive (A) Negative    Protein, UA 30 (1+) (A) Negative mg/dl    Glucose, UA Negative Negative mg/dl    Ketones, UA Negative Negative mg/dl    Urobilinogen, UA <2.0 <2.0 mg/dl mg/dl    Bilirubin, UA Negative Negative    Occult Blood, UA Negative Negative    RBC, UA 4-10 (A) None Seen, 1-2 /hpf    WBC, UA Innumerable (A) None Seen, 1-2 /hpf    Epithelial Cells Occasional None Seen, Occasional /hpf    Bacteria, UA Occasional None Seen, Occasional /hpf    WBC Clumps Present    TIBC Panel (incl. Iron, TIBC, % Iron Saturation)    Collection Time: 02/06/24  7:14 AM   Result Value Ref Range    Iron Saturation 20 15 - 50 %    TIBC 360 250 - 450 ug/dL    Iron 73 50 - 212 ug/dL    UIBC  287 155 - 355 ug/dL   Ferritin    Collection Time: 02/06/24  7:14 AM   Result Value Ref Range    Ferritin 88 11 - 307 ng/mL   Tissue Exam    Collection Time: 03/21/24 10:08 AM   Result Value Ref Range    Case Report       Surgical Pathology Report                         Case: [id number]                                  Authorizing Provider:  Reginald Bowers MD           Collected:           03/21/2024 1008              Ordering Location:     [facility] Received:            03/22/2024 [address] [place] [address]                                                                  Pathologist:           Rebeca Cespedes MD                                                       Specimen:    Colon, cold bx anastomosis site erythema                                                   Final Diagnosis       A. Colon, anastomosis site erythema, biopsy:  - Benign fragments of ileocolonic mucosa with mild reactive changes  - Negative for dysplasia and malignancy        Additional Information       All reported additional testing was performed with appropriately reactive controls.  These tests were developed and their performance characteristics determined by [facility] or appropriate performing facility, though some tests may be performed on tissues which have not been validated for performance characteristics (such as staining performed on alcohol exposed cell blocks and decalcified tissues).  Results should be interpreted with caution and in the context of the patients’ clinical condition. These tests may not be cleared or approved by the U.S. Food and Drug Administration, though the FDA has determined that such clearance or approval is not necessary. These tests are used for clinical purposes and they should not be regarded as investigational or for research. This laboratory has been approved by CLIA 88, designated as a high-complexity laboratory and is qualified to  "perform these tests.    Interpretation performed at [facility] [address] [place] [address] [place] [address]   .      Gross Description       A. The specimen is received in formalin, labeled with the patient's name and hospital number, and is designated \" colon biopsy anastomosis site erythema\".  The specimen consists of multiple tan soft tissue fragments measuring in loose aggregate 1.3 x 0.2 x 0.2 cm.  Entirely submitted. One screened cassette.    Note: The estimated total formalin fixation time based upon information provided by the submitting clinician and the standard processing schedule is under 72 hours. AKemmerer       Urinalysis with microscopic    Collection Time: 06/03/24 11:35 AM   Result Value Ref Range    Color, UA Yellow Yellow    Clarity, UA Clear Clear    Specific Gravity, UA <=1.005 1.001 - 1.030    pH, UA 6.0 5.0, 5.5, 6.0, 6.5, 7.0, 7.5, 8.0    Leukocytes, UA 2+ (A) Negative    Nitrite, UA Negative Negative    Protein, UA Negative Negative, Interference- unable to analyze mg/dl    Glucose, UA Negative Negative mg/dl    Ketones, UA Negative Negative mg/dl    Urobilinogen, UA 0.2 0.2, 1.0 E.U./dl E.U./dl    Bilirubin, UA Negative Negative    Occult Blood, UA Negative Negative    RBC, UA 0-1 None Seen, 0-1, 1-2, 2-4, 0-5 /hpf    WBC, UA 10-20 (A) None Seen, 0-1, 1-2, 0-5, 2-4 /hpf    Epithelial Cells Occasional None Seen, Occasional /hpf    Bacteria, UA Moderate (A) None Seen, Occasional /hpf   Urine culture    Collection Time: 06/03/24 11:55 AM    Specimen: Urine   Result Value Ref Range    Urine Culture >100,000 cfu/ml Escherichia coli (A)        Susceptibility    Escherichia coli - MARCIA     ZID Performed Yes       Ampicillin ($$) <=8.00 Susceptible ug/ml     Aztreonam ($$$)  <=4 Susceptible ug/ml     Cefazolin ($) <=2.00 Susceptible ug/ml     Ciprofloxacin ($)  >2.00 Resistant ug/ml     Gentamicin ($$) <=2 Susceptible ug/ml     Levofloxacin ($) >4.00 Resistant ug/ml     Nitrofurantoin <=32 " Susceptible ug/ml     Tetracycline <=4 Susceptible ug/ml     Tobramycin ($) <=2 Susceptible ug/ml     Trimethoprim + Sulfamethoxazole ($$$) <=0.5/9.5 Susceptible ug/ml   Vitamin B12    Collection Time: 06/03/24 11:55 AM   Result Value Ref Range    Vitamin B-12 1,759 (H) 180 - 914 pg/mL   POCT urine dip    Collection Time: 07/11/24  8:51 AM   Result Value Ref Range    LEUKOCYTE ESTERASE,UA small     NITRITE,UA neg     SL AMB POCT UROBILINOGEN neg     POCT URINE PROTEIN neg      PH,UA 5     BLOOD,UA neg     SPECIFIC GRAVITY,UA 1.010     KETONES,UA neg     BILIRUBIN,UA neg     GLUCOSE, UA neg      COLOR,UA yellow     CLARITY,UA clear        Laboratory Results: I have personally reviewed the pertinent laboratory results/reports     Radiology/Other Diagnostic Testing Results: I have personally reviewed pertinent reports.      US bladder    Result Date: 6/12/2024  BLADDER ULTRASOUND INDICATION: N39.0: Urinary tract infection, site not specified R31.9: Hematuria, unspecified. COMPARISON: None. TECHNIQUE: Ultrasound of the bladder was performed with a curvilinear transducer utilizing volumetric sweeps and still imaging techniques. FINDINGS: BLADDER: Normally distended. No focal thickening or mass lesions. Bilateral ureteral jets detected.     Normal study. Workstation performed: [id number]     XR chest pa & lateral    Result Date: 6/3/2024  CHEST INDICATION:   Other fatigue. Diffuse large b-cell lymphoma, lymph nodes of multiple sites. COMPARISON:  None. EXAM PERFORMED/VIEWS:  XR CHEST PA & LATERAL FINDINGS: Cardiomediastinal silhouette appears unremarkable. The lungs are clear.  No pneumothorax or pleural effusion. Osseous structures appear within normal limits for patient age. Old left healed sixth posterolateral rib fracture, unchanged from prior     No acute cardiopulmonary disease.  No significant change from 2/24/2017 Electronically signed: 06/03/2024 02:00 PM Marcial Greenfield MD       Assessment/Plan:  1. Urinary  "frequency  Assessment & Plan:  Check UA dip and send for UA micr/oculture   Orders:  -     POCT urine dip  -     Urine culture                   Read package inserts for all medications before starting a new medications, call me if you have any questions.    Patient was given opportunity to ask questions and all questions were answered.    Disclaimer: Portions of the record may have been created with voice recognition software. Occasional wrong word or \"sound a like\" substitutions may have occurred due to the inherent limitations of voice recognition software. Read the chart carefully and recognize, using context, where substitutions have occurred. I have used the Epic copy/forward function to compose this note. I have reviewed my current note to ensure it reflects the current patient status, exam, assessment and plan.  "

## 2024-07-14 LAB — BACTERIA UR CULT: ABNORMAL

## 2024-07-15 DIAGNOSIS — N39.0 URINARY TRACT INFECTION WITHOUT HEMATURIA, SITE UNSPECIFIED: Primary | ICD-10-CM

## 2024-07-15 RX ORDER — NITROFURANTOIN 25; 75 MG/1; MG/1
100 CAPSULE ORAL 2 TIMES DAILY
Qty: 14 CAPSULE | Refills: 0 | Status: SHIPPED | OUTPATIENT
Start: 2024-07-15 | End: 2024-07-22

## 2024-07-20 ENCOUNTER — DOCUMENTATION (OUTPATIENT)
Dept: FAMILY MEDICINE CLINIC | Facility: CLINIC | Age: 74
End: 2024-07-20

## 2024-07-20 ENCOUNTER — NURSE TRIAGE (OUTPATIENT)
Dept: OTHER | Facility: OTHER | Age: 74
End: 2024-07-20

## 2024-07-20 DIAGNOSIS — B37.9 YEAST INFECTION: Primary | ICD-10-CM

## 2024-07-20 RX ORDER — FLUCONAZOLE 150 MG/1
TABLET ORAL
Qty: 2 TABLET | Refills: 3 | Status: SHIPPED | OUTPATIENT
Start: 2024-07-20 | End: 2024-07-23

## 2024-07-20 NOTE — TELEPHONE ENCOUNTER
ESC from on call-    Pt should be seen for this.    Diflucan was called into pharmacy.  Reason for Disposition  • MODERATE-SEVERE itching (i.e., interferes with school, work, or sleep)    Protocols used: Vulvar Symptoms-ADULT-AH

## 2024-07-20 NOTE — TELEPHONE ENCOUNTER
Spoke with patient and informed her of providers recommendations. Pt will go to urgent care tomorrow to be seen.

## 2024-07-20 NOTE — TELEPHONE ENCOUNTER
"Regarding: gential rash  ----- Message from Lin VIGIL sent at 7/20/2024 10:35 AM EDT -----  \" I have a rash in my genital area, I was prescribed medication in the past for it and I'm wondering if something can be sent today.\"    "

## 2024-07-20 NOTE — TELEPHONE ENCOUNTER
"Additional Information  • Rash of external female genital area (vulva)    Answer Assessment - Initial Assessment Questions  1. APPEARANCE of RASH: \"Describe the rash.\"       Genital area and upper legs has rash and is discolored  2. LOCATION: \"Where is the rash located?\"       Genital area and legs  3. NUMBER: \"How many spots are there?\"       \"Big dark area by vagina\"  4. SIZE: \"How big are the spots?\" (Inches, centimeters or compare to size of a coin)       One big red/brown area  5. ONSET: \"When did the rash start?\"       Wednesday or thurday  6. ITCHING: \"Does the rash itch?\" If Yes, ask: \"How bad is the itch?\"  (Scale 1-10; or mild, moderate, severe)      severe  7. PAIN: \"Does the rash hurt?\" If Yes, ask: \"How bad is the pain?\"  (Scale 1-10; or mild, moderate, severe)      denies  8. OTHER SYMPTOMS: \"Do you have any other symptoms?\" (e.g., fever)      Denies    Pt is on Macrobid for a UTI. Rash started after starting macrobid.    Protocols used: Rash or Redness - Localized-ADULT-AH    "

## 2024-07-20 NOTE — TELEPHONE ENCOUNTER
ESC to on call-    Hi Dr Kaplan, this is Crystal with [facility]. I am speaking with Ashleigh Ce HU 48. She is a patient of Dr Deng. She is being treated for a UTI, she was started on abx 3 days ago. Two days ago she developed a rash on her vulva and inside her vagina. she said the rash is a reddish/ brown and is severely itchy. She denies any abnormal vaginal discharge. 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 Please advise on what to tell her, thank you.

## 2024-07-21 ENCOUNTER — OFFICE VISIT (OUTPATIENT)
Dept: URGENT CARE | Facility: CLINIC | Age: 74
End: 2024-07-21
Payer: COMMERCIAL

## 2024-07-21 VITALS
HEART RATE: 62 BPM | SYSTOLIC BLOOD PRESSURE: 121 MMHG | OXYGEN SATURATION: 99 % | RESPIRATION RATE: 14 BRPM | BODY MASS INDEX: 20.43 KG/M2 | WEIGHT: 111 LBS | TEMPERATURE: 97.6 F | DIASTOLIC BLOOD PRESSURE: 67 MMHG | HEIGHT: 62 IN

## 2024-07-21 DIAGNOSIS — B37.2 YEAST INFECTION OF THE SKIN: Primary | ICD-10-CM

## 2024-07-21 PROCEDURE — 99214 OFFICE O/P EST MOD 30 MIN: CPT | Performed by: PHYSICIAN ASSISTANT

## 2024-07-21 PROCEDURE — S9083 URGENT CARE CENTER GLOBAL: HCPCS | Performed by: PHYSICIAN ASSISTANT

## 2024-07-21 RX ORDER — NYSTATIN 100000 U/G
OINTMENT TOPICAL 2 TIMES DAILY
Qty: 30 G | Refills: 0 | Status: SHIPPED | OUTPATIENT
Start: 2024-07-21 | End: 2024-07-28

## 2024-07-21 NOTE — PROGRESS NOTES
[facility]        NAME: Ashleigh Encinas is a 74 y.o. female  : 1950    MRN: [id number]  DATE: 2024  TIME: 8:55 AM    Assessment and Plan   Yeast infection of the skin [B37.2]  1. Yeast infection of the skin  nystatin (MYCOSTATIN) ointment      Pt presents with symptoms and exam consistent with candidal infection of the labia and groin. She will be started on Nystatin ointment to treat.       Patient Instructions     Patient Instructions   Apply thin layer of Nystain ointment twice daily for 7-14 days as needed.   Repeat Diflucan Tuesday night.   IF symptoms are not improved in 2-3 days, follow-up with PCP or GYN.   IF symptoms worsen or new symptoms develop, report to the emergency department immediately.     Follow up with PCP in 3-5 days.  Proceed to  ER if symptoms worsen.    If tests have been performed at [facility], our office will contact you with results if changes need to be made to the care plan discussed with you at the visit. [facility]     Chief Complaint     Chief Complaint   Patient presents with    Rash     Pt reports groin rash.          History of Present Illness       74 year old female with PMHx of lichen sclerosis presents with vulvar itching x 9 days. She states that she was seen 10 days ago for dysuria and was started on macrobid for a UTI for 7days. She states that after she took the antibiotic, she started with vulvovaginal itching. She called her family doctor, who prescribed her diflucan. 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 She has a second dose that she was instructed to take in 3 days if her symptoms did not improve. She denies associated fever, chills, nausea, vomiting, vaginal discharge, or pelvic pain.     Rash  Pertinent negatives include no diarrhea, fever or vomiting.       Review of Systems   Review of Systems   Constitutional:   Negative for chills and fever.   Gastrointestinal:  Negative for abdominal distention, abdominal pain, diarrhea, nausea and vomiting.   Genitourinary:  Negative for dysuria, flank pain, frequency, pelvic pain, urgency, vaginal discharge and vaginal pain.   Musculoskeletal:  Negative for back pain.   Skin:  Positive for rash (vulvar area).         Current Medications       Current Outpatient Medications:     nystatin (MYCOSTATIN) ointment, Apply topically 2 (two) times a day for 7 days, Disp: 30 g, Rfl: 0    bromfenac sodium 0.07 % SOLN, INSTILL 1 DROP BY OPHTHALMIC ROUTE ONCE DAILY IN THE RIGHT EYE, Disp: , Rfl:     cholecalciferol (VITAMIN D3) 1,000 units tablet, Take 1,000 Units by mouth daily, Disp: , Rfl:     Cyanocobalamin (VITAMIN B-12) 3000 MCG/ML LIQD, Place under the tongue, Disp: , Rfl:     fluconazole (DIFLUCAN) 150 mg tablet, 1 TAB PO TODAY, 1 TAB PO 3 DAYS LATER, Disp: 2 tablet, Rfl: 3    Multiple Vitamins-Minerals (CENTRUM SILVER 50+WOMEN PO), Take by mouth, Disp: , Rfl:     nitrofurantoin (MACROBID) 100 mg capsule, Take 1 capsule (100 mg total) by mouth 2 (two) times a day for 7 days, Disp: 14 capsule, Rfl: 0    Polyethylene Glycol 3350 (MIRALAX PO), Take by mouth, Disp: , Rfl:     Turmeric 500 MG CAPS, Take 2 capsules by mouth in the morning, Disp: , Rfl:     Current Allergies     Allergies as of 07/21/2024 - Reviewed 07/21/2024   Allergen Reaction Noted    Levofloxacin Hives     Penicillins Hives and Rash 09/06/2019            The following portions of the patient's history were reviewed and updated as appropriate: allergies, current medications, past family history, past medical history, past social history, past surgical history and problem list.     Past Medical History:   Diagnosis Date    Allergic     see chart    Anxiety     Arthritis     Bladder tumor     Cancer (HCC) 2017    Lymphoma    Colon cancer (HCC)     Non Hodgkin lymphoma bladder, colon, stomach 2017    Colon polyp     IBS (irritable  "bowel syndrome)     Inflammatory bowel disease 20 years ago    Kidney stone several since the late     Memory loss 1 year ago    Non Hodgkin's lymphoma (HCC) 2017    Renal calculi     Urinary tract infection a few    Vasculitis of skin     Visual impairment     I wear glasses       Past Surgical History:   Procedure Laterality Date    APPENDECTOMY      BREAST BIOPSY       SECTION      X2    COLON SURGERY      COLONOSCOPY      CYSTOSCOPY W/ DILATION OF BLADDER      CYSTOSCOPY W/ RETROGRADES      ,    CYSTOSCOPY W/ URETERAL STENT PLACEMENT          CYSTOSCOPY W/ URETEROSCOPY          LYMPH NODE BIOPSY      MASS EXCISION      in bladder due to lymphoma    SMALL INTESTINE SURGERY      resection    TONSILLECTOMY      US GUIDED VASCULAR ACCESS  2017       Family History   Problem Relation Age of Onset    Alzheimer's disease Mother     Arthritis Mother     Dementia Mother     Stomach cancer Father     Cancer Father         stomach cancer    Hearing loss Father          Medications have been verified.        Objective   /67   Pulse 62   Temp 97.6 °F (36.4 °C)   Resp 14   Ht 5' 2\" (1.575 m)   Wt 50.3 kg (111 lb)   LMP 1998 (Approximate)   SpO2 99%   BMI 20.30 kg/m²   Patient's last menstrual period was 1998 (approximate).       Physical Exam     Physical Exam  Constitutional:       General: She is awake. She is not in acute distress.     Appearance: Normal appearance. She is well-developed and well-groomed. She is not ill-appearing, toxic-appearing or diaphoretic.   HENT:      Head: Normocephalic and atraumatic.      Right Ear: Hearing and external ear normal.      Left Ear: Hearing and external ear normal.   Eyes:      General: Lids are normal. Vision grossly intact. Gaze aligned appropriately.   Cardiovascular:      Rate and Rhythm: Normal rate.   Pulmonary:      Effort: Pulmonary effort is normal.      Comments: Patient is speaking in full sentences with " "no increased respiratory effort. No audible wheezing or stridor.   Musculoskeletal:      Cervical back: Normal range of motion.   Skin:     General: Skin is warm and dry.      Findings: Rash (erythematous, edematous rash of vulvovaginal area. No discharge noted on exam.) present.      Comments: Mild to moderate swelling of the vulva with erythema of bilateral groin as well. Beefy red appearance consistent with candidiasis.   Neurological:      Mental Status: She is alert and oriented to person, place, and time.      Coordination: Coordination is intact.      Gait: Gait is intact.   Psychiatric:         Attention and Perception: Attention and perception normal.         Mood and Affect: Mood and affect normal.         Speech: Speech normal.         Behavior: Behavior normal. Behavior is cooperative.               Note: Portions of this record may have been created with voice recognition software. Occasional wrong word or \"sound a like\" substitutions may have occurred due to the inherent limitations of voice recognition software. Please read the chart carefully and recognize, using context, where substitutions have occurred.*      "

## 2024-07-21 NOTE — PATIENT INSTRUCTIONS
Apply thin layer of Nystain ointment twice daily for 7-14 days as needed.   Repeat Diflucan Tuesday night.   IF symptoms are not improved in 2-3 days, follow-up with PCP or GYN.   IF symptoms worsen or new symptoms develop, report to the emergency department immediately.

## 2024-07-25 ENCOUNTER — TELEPHONE (OUTPATIENT)
Age: 74
End: 2024-07-25

## 2024-07-25 DIAGNOSIS — F41.9 ANXIETY AND DEPRESSION: Primary | ICD-10-CM

## 2024-07-25 DIAGNOSIS — F32.A ANXIETY AND DEPRESSION: Primary | ICD-10-CM

## 2024-07-25 RX ORDER — SERTRALINE HYDROCHLORIDE 25 MG/1
25 TABLET, FILM COATED ORAL DAILY
Qty: 30 TABLET | Refills: 5 | Status: SHIPPED | OUTPATIENT
Start: 2024-07-25 | End: 2025-01-21

## 2024-07-25 NOTE — TELEPHONE ENCOUNTER
Patient called the RX Refill Line. Message is being forwarded to the office.     Patient is requesting a refill on   sertraline (ZOLOFT) 25 mg tablet. It is no longer on her active med list. If appropriate, please send to [facility] [address]      Please contact patient at [phone]

## 2024-09-10 ENCOUNTER — RA CDI HCC (OUTPATIENT)
Dept: OTHER | Facility: HOSPITAL | Age: 74
End: 2024-09-10

## 2024-09-17 ENCOUNTER — OFFICE VISIT (OUTPATIENT)
Dept: FAMILY MEDICINE CLINIC | Facility: CLINIC | Age: 74
End: 2024-09-17
Payer: COMMERCIAL

## 2024-09-17 VITALS
HEIGHT: 62 IN | BODY MASS INDEX: 20.98 KG/M2 | SYSTOLIC BLOOD PRESSURE: 126 MMHG | DIASTOLIC BLOOD PRESSURE: 60 MMHG | HEART RATE: 52 BPM | WEIGHT: 114 LBS | OXYGEN SATURATION: 99 %

## 2024-09-17 DIAGNOSIS — M85.89 OSTEOPENIA OF MULTIPLE SITES: ICD-10-CM

## 2024-09-17 DIAGNOSIS — F41.1 GENERALIZED ANXIETY DISORDER: ICD-10-CM

## 2024-09-17 DIAGNOSIS — C83.38 DIFFUSE LARGE B-CELL LYMPHOMA OF LYMPH NODES OF MULTIPLE REGIONS (HCC): Primary | ICD-10-CM

## 2024-09-17 PROBLEM — S70.02XA CONTUSION OF LEFT HIP: Status: RESOLVED | Noted: 2021-08-13 | Resolved: 2024-09-17

## 2024-09-17 PROBLEM — S49.92XA INJURY OF LEFT SHOULDER: Status: RESOLVED | Noted: 2021-08-13 | Resolved: 2024-09-17

## 2024-09-17 PROBLEM — S93.401D MILD ANKLE SPRAIN, RIGHT, SUBSEQUENT ENCOUNTER: Status: RESOLVED | Noted: 2022-04-01 | Resolved: 2024-09-17

## 2024-09-17 PROBLEM — L98.9 SKIN LESION: Status: RESOLVED | Noted: 2023-09-12 | Resolved: 2024-09-17

## 2024-09-17 PROBLEM — S20.212A CONTUSION OF LEFT CHEST WALL: Status: RESOLVED | Noted: 2021-08-13 | Resolved: 2024-09-17

## 2024-09-17 PROBLEM — H25.9 AGE-RELATED CATARACT OF BOTH EYES: Status: RESOLVED | Noted: 2022-09-08 | Resolved: 2024-09-17

## 2024-09-17 PROBLEM — E44.1 MILD PROTEIN-CALORIE MALNUTRITION (HCC): Status: RESOLVED | Noted: 2024-02-05 | Resolved: 2024-09-17
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
## 2024-09-17 PROBLEM — R35.0 URINARY FREQUENCY: Status: RESOLVED | Noted: 2024-07-11 | Resolved: 2024-09-17

## 2024-09-17 PROBLEM — R41.3 MEMORY PROBLEM: Status: RESOLVED | Noted: 2021-01-21 | Resolved: 2024-09-17

## 2024-09-17 PROCEDURE — 99213 OFFICE O/P EST LOW 20 MIN: CPT | Performed by: FAMILY MEDICINE

## 2024-09-17 PROCEDURE — G0439 PPPS, SUBSEQ VISIT: HCPCS | Performed by: FAMILY MEDICINE

## 2024-09-17 NOTE — PROGRESS NOTES
Ambulatory Visit  Name: Ashleigh Encinas      : 1950      MRN: [id number]  Encounter Provider: Brea Deng MD  Encounter Date: 2024   Encounter department: [facility]    Assessment & Plan       Preventive health issues were discussed with patient, and age appropriate screening tests were ordered as noted in patient's After Visit Summary. Personalized health advice and appropriate referrals for health education or preventive services given if needed, as noted in patient's After Visit Summary.    History of Present Illness     HPI   Patient Care Team:  Brea Deng MD as PCP - General (Family Medicine)    Review of Systems  Medical History Reviewed by provider this encounter:       Annual Wellness Visit Questionnaire       Health Risk Assessment:   Patient rates overall health as good. Patient feels that their physical health rating is same. Patient is very satisfied with their life. Eyesight was rated as same. Hearing was rated as same. Patient feels that their emotional and mental health rating is same. Patients states they are never, rarely angry. Patient states they are sometimes unusually tired/fatigued. Pain experienced in the last 7 days has been some. Patient's pain rating has been 4/10. Patient states that she has experienced no weight loss or gain in last 6 months.     Fall Risk Screening:   In the past year, patient has experienced: history of falling in past year    Number of falls: 1  Injured during fall?: No    Feels unsteady when standing or walking?: No    Worried about falling?: No      Urinary Incontinence Screening:   Patient has not leaked urine accidently in the last six months.     Home Safety:  Patient does not have trouble with stairs inside or outside of their home. Patient has working smoke alarms and has working carbon monoxide detector. Home safety hazards include: none.     Nutrition:   Current diet is Regular.     Medications:   Patient is  currently taking over-the-counter supplements. OTC medications include: see medication list. Patient is able to manage medications.     Activities of Daily Living (ADLs)/Instrumental Activities of Daily Living (IADLs):   Walk and transfer into and out of bed and chair?: Yes  Dress and groom yourself?: Yes    Bathe or shower yourself?: Yes    Feed yourself? Yes  Do your laundry/housekeeping?: Yes  Manage your money, pay your bills and track your expenses?: Yes  Make your own meals?: Yes    Do your own shopping?: Yes    Previous Hospitalizations:   Any hospitalizations or ED visits within the last 12 months?: No      Advance Care Planning:   Living will: Yes    Durable POA for healthcare: Yes    Advanced directive: Yes      Cognitive Screening:   Provider or family/friend/caregiver concerned regarding cognition?: No    PREVENTIVE SCREENINGS      Cardiovascular Screening:    General: Screening Current      Diabetes Screening:     General: Screening Current      Colorectal Cancer Screening:     General: History Colorectal Cancer      Breast Cancer Screening:     General: Screening Current      Cervical Cancer Screening:    General: Screening Not Indicated      Osteoporosis Screening:      Due for: DXA Axial      Abdominal Aortic Aneurysm (AAA) Screening:        General: Screening Not Indicated      Lung Cancer Screening:     General: Screening Not Indicated      Hepatitis C Screening:    General: Screening Current    Screening, Brief Intervention, and Referral to Treatment (SBIRT)    Screening  Typical number of drinks in a day: 0  Typical number of drinks in a week: 0  Interpretation: Low risk drinking behavior.    AUDIT-C Screenin) How often did you have a drink containing alcohol in the past year? monthly or less  2) How many drinks did you have on a typical day when you were drinking in the past year? 0  3) How often did you have 6 or more drinks on one occasion in the past year? never    AUDIT-C Score:  1  Interpretation: Score 0-2 (female): Negative screen for alcohol misuse    Single Item Drug Screening:  How often have you used an illegal drug (including marijuana) or a prescription medication for non-medical reasons in the past year? never    Single Item Drug Screen Score: 0  Interpretation: Negative screen for possible drug use disorder    Social Determinants of Health     Financial Resource Strain: Low Risk  (9/5/2023)    Overall Financial Resource Strain (CARDIA)     Difficulty of Paying Living Expenses: Not hard at all   Food Insecurity: No Food Insecurity (9/16/2024)    Hunger Vital Sign     Worried About Running Out of Food in the Last Year: Never true     Ran Out of Food in the Last Year: Never true   Transportation Needs: No Transportation Needs (9/16/2024)    PRAPARE - Transportation     Lack of Transportation (Medical): No     Lack of Transportation (Non-Medical): No   Housing Stability: Low Risk  (9/16/2024)    Housing Stability Vital Sign     Unable to Pay for Housing in the Last Year: No     Number of Times Moved in the Last Year: 0     Homeless in the Last Year: No   Utilities: Not At Risk (9/16/2024)    [facility] Utilities     Threatened with loss of utilities: No     No results found.    Objective     LMP 06/11/1998 (Approximate)     Physical Exam

## 2024-09-17 NOTE — ASSESSMENT & PLAN NOTE
Osteopenia on dexa scan . Vit D 1056-5106 units  daily and low fat calcium rich foods; weight bearing exercise is advised

## 2024-09-17 NOTE — PROGRESS NOTES
"Ambulatory Visit  Name: Ashleigh Encinas      : 1950      MRN: [id number]  Encounter Provider: Brea Deng MD  Encounter Date: 2024   Encounter department: [facility]    Assessment & Plan  Diffuse large B-cell lymphoma of lymph nodes of multiple regions (HCC)  Reviewed  oncology  note in remission         Generalized anxiety disorder  Pt wants to go off meds pt to taper down as explained          Osteopenia of multiple sites  Osteopenia on dexa scan . Vit D 3387-0966 units  daily and low fat calcium rich foods; weight bearing exercise is advised               History of Present Illness     Pt is here for interval visit and evaluation of multiple medical problems, review of medications, labs, Health Maintenance and any recent specialty consults oncology  due for medicare wellness            Review of Systems   Constitutional:  Negative for appetite change, chills, fatigue and fever.   Respiratory:  Negative for cough, chest tightness and shortness of breath.    Cardiovascular:  Negative for chest pain, palpitations and leg swelling.   Gastrointestinal:  Negative for abdominal pain, constipation, diarrhea, nausea and vomiting.   Genitourinary:  Negative for difficulty urinating and frequency.   Musculoskeletal:  Negative for arthralgias, back pain, gait problem and neck pain.   Skin:  Negative for rash.   Neurological:  Negative for dizziness, weakness, light-headedness, numbness and headaches.   Hematological:  Does not bruise/bleed easily.   Psychiatric/Behavioral:  Negative for dysphoric mood and sleep disturbance. The patient is not nervous/anxious.            Objective     /60 (BP Location: Left arm, Patient Position: Sitting, Cuff Size: Standard)   Pulse (!) 52   Ht 5' 2\" (1.575 m)   Wt 51.7 kg (114 lb)   LMP 1998 (Approximate)   SpO2 99%   BMI 20.85 kg/m²     Physical Exam  Constitutional:       General: She is not in acute distress.     Appearance: " "Normal appearance. She is normal weight.   Neck:      Thyroid: No thyromegaly.      Vascular: No carotid bruit.   Cardiovascular:      Rate and Rhythm: Normal rate and regular rhythm.      Heart sounds: Normal heart sounds. No murmur heard.  Pulmonary:      Effort: Pulmonary effort is normal. No respiratory distress.      Breath sounds: Normal breath sounds. No wheezing, rhonchi or rales.   Abdominal:      Palpations: Abdomen is soft.      Tenderness: There is no abdominal tenderness.   Musculoskeletal:      Cervical back: Normal range of motion and neck supple.      Right lower leg: No edema.      Left lower leg: No edema.   Skin:     Findings: No rash.   Neurological:      General: No focal deficit present.      Mental Status: She is alert and oriented to person, place, and time. Mental status is at baseline.      Gait: Gait normal.   Psychiatric:         Mood and Affect: Mood normal.         Behavior: Behavior normal.         Answers submitted by the patient for this visit:  Medicare Annual Wellness Visit (Submitted on 9/16/2024)  How would you rate your overall health?: good  Compared to last year, how is your physical health?: same  In general, how satisfied are you with your life?: very satisfied  Compared to last year, how is your eyesight?: same  Compared to last year, how is your hearing?: same  Compared to last year, how is your emotional/mental health?: same  How often is anger a problem for you?: never, rarely  How often do you feel unusually tired/fatigued?: sometimes  In the past 7 days, how much pain have you experienced?: some  If you answered \"some\" or \"a lot\", please rate the severity of your pain on a scale of 1 to 10 (1 being the least severe pain and 10 being the most intense pain).: 4/10  In the past 6 months, have you lost or gained 10 pounds without trying?: No  One or more falls in the last year: Yes  In the past 6 months, have you accidentally leaked urine?: No  Do you have trouble with the " stairs inside or outside your home?: No  Does your home have working smoke alarms?: Yes  Does your home have a carbon monoxide monitor?: Yes  Which safety hazards (if any) have you experienced in your home? Please select all that apply.: none  How would you describe your current diet? Please select all that apply.: Regular  In addition to prescription medications, are you taking any over-the-counter supplements?: Yes  If yes, what supplements are you taking?: D3, Turmeric, Vitamin B 12  Can you manage your medications?: Yes  Are you currently taking any opioid medications?: No  Can you walk and transfer into and out of your bed and chair?: Yes  Can you dress and groom yourself?: Yes  Can you bathe or shower yourself?: Yes  Can you feed yourself?: Yes  Can you do your laundry/ housekeeping?: Yes  Can you manage your money, pay your bills, and track your expenses?: Yes  Can you make your own meals?: Yes  Can you do your own shopping?: Yes  Within the last 12 months, have you had any hospitalizations or Emergency Department visits?: No  Do you have a living will?: Yes  Do you have a Durable POA (Power of ) for healthcare decisions?: Yes  Do you have an Advanced Directive for end of life decisions?: Yes  How often have you used an illegal drug (including marijuana) or a prescription medication for non-medical reasons in the past year?: never  What is the typical number of drinks you consume in a day?: 0  What is the typical number of drinks you consume in a week?: 0  How often did you have a drink containing alcohol in the past year?: monthly or less  How many drinks did you have on a typical day  when you were drinking in the past year?: 0  How often did you have 6 or more drinks on one occasion in the past year?: never

## 2024-10-24 NOTE — TELEPHONE ENCOUNTER
Pt called and said she does not have the instruction sheet for her prep and her colon is scheduled for tomorrow. I informed pt instructions were sent to her My Chart.    RECEIVING UNIT ED HANDOFF REVIEW    ED Nurse Handoff Report was reviewed by: Shaniqua Shipley RN on October 24, 2024 at 1:28 PM

## 2024-12-07 ENCOUNTER — HOSPITAL ENCOUNTER (OUTPATIENT)
Dept: RADIOLOGY | Age: 74
Discharge: HOME | End: 2024-12-07
Payer: COMMERCIAL

## 2024-12-07 DIAGNOSIS — M85.89 OSTEOPENIA OF MULTIPLE SITES: ICD-10-CM

## 2024-12-07 PROCEDURE — 77080 DXA BONE DENSITY AXIAL: CPT

## 2024-12-09 ENCOUNTER — RESULTS FOLLOW-UP (OUTPATIENT)
Dept: FAMILY MEDICINE CLINIC | Facility: CLINIC | Age: 74
End: 2024-12-09

## 2025-03-03 ENCOUNTER — RA CDI HCC (OUTPATIENT)
Dept: OTHER | Facility: HOSPITAL | Age: 75
End: 2025-03-03

## 2025-03-10 ENCOUNTER — OFFICE VISIT (OUTPATIENT)
Dept: FAMILY MEDICINE CLINIC | Facility: CLINIC | Age: 75
End: 2025-03-10
Payer: COMMERCIAL

## 2025-03-10 VITALS
HEIGHT: 62 IN | HEART RATE: 69 BPM | OXYGEN SATURATION: 99 % | SYSTOLIC BLOOD PRESSURE: 128 MMHG | DIASTOLIC BLOOD PRESSURE: 78 MMHG | WEIGHT: 114 LBS | BODY MASS INDEX: 20.98 KG/M2

## 2025-03-10 DIAGNOSIS — C83.38 DIFFUSE LARGE B-CELL LYMPHOMA OF LYMPH NODES OF MULTIPLE REGIONS (HCC): ICD-10-CM

## 2025-03-10 DIAGNOSIS — F41.1 GENERALIZED ANXIETY DISORDER: ICD-10-CM

## 2025-03-10 DIAGNOSIS — Z85.72 HISTORY OF B-CELL LYMPHOMA: Primary | ICD-10-CM

## 2025-03-10 PROCEDURE — 99214 OFFICE O/P EST MOD 30 MIN: CPT | Performed by: INTERNAL MEDICINE

## 2025-03-10 PROCEDURE — G2211 COMPLEX E/M VISIT ADD ON: HCPCS | Performed by: INTERNAL MEDICINE

## 2025-03-10 RX ORDER — SERTRALINE HYDROCHLORIDE 25 MG/1
25 TABLET, FILM COATED ORAL DAILY
Qty: 30 TABLET | Refills: 5 | Status: SHIPPED | OUTPATIENT
Start: 2025-03-10 | End: 2025-09-06

## 2025-03-10 NOTE — PROGRESS NOTES
"Assessment/Plan:Spoke for awhile with Ashleigh about anxiety, stress, and different methods of handling things- wrote down several different supplements that might be beneficial as well as spoke about practicing mindfulness and meditation and wrote down some phone apps that might be useful for her-she ultimately did want to try a low dose of zoloft so I sent 25 mg for her and went over benefits, side effects etc will follow up with her in 6-8 weeks         Problem List Items Addressed This Visit       Generalized anxiety disorder    Relevant Medications    sertraline (ZOLOFT) 25 mg tablet    History of B-cell lymphoma - Primary    Diffuse large B-cell lymphoma of lymph nodes of multiple regions ([facility])         Subjective:      Patient ID: Ashleigh Encinas is a 75 y.o. female.    Lisa here having some issues with worry, anxiety, stressing about things. 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 [employer] and nervous about that-has a lot of \"what if\" thoughts that bother her-she used to take a very low dose of Zoloft years ago        The following portions of the patient's history were reviewed and updated as appropriate:   Past Medical History:  She has a past medical history of Allergic, Anxiety, Arthritis, Bladder tumor, Cancer (Former[facility]) (2017), Colon cancer ([facility]), Colon polyp, IBS (irritable bowel syndrome), Inflammatory bowel disease (20 years ago), Kidney stone (several since the late s), Memory loss (1 year ago), Non Hodgkin's lymphoma ([facility]) (2017), Renal calculi, Urinary tract infection (a few), Vasculitis of skin, and Visual impairment.,  _______________________________________________________________________  Medical Problems:  does not have any pertinent problems on file.,  _______________________________________________________________________  Past Surgical History:   has a past surgical history that includes Tonsillectomy; Appendectomy;  section; Small intestine surgery; " Mass excision; Colonoscopy; Breast biopsy; Cystoscopy; Cystoscopy w/ ureteral stent placement; Cystoscopy w/ dilation of bladder (2017); Cystoscopy w/ ureteroscopy; US guided vascular access (02/08/2017); Colon surgery; and Lymph node biopsy.,  _______________________________________________________________________  Family History:  family history includes Alzheimer's disease in her mother; Arthritis in her mother; Cancer in her father; Dementia in her mother; Hearing loss in her father; Stomach cancer in her father.,  _______________________________________________________________________  Social History:   reports that she has never smoked. She has never used smokeless tobacco. She reports current alcohol use. She reports that she does not use drugs.,  _______________________________________________________________________  Allergies:  is allergic to levofloxacin and penicillins..  _______________________________________________________________________  Current Outpatient Medications   Medication Sig Dispense Refill    cholecalciferol (VITAMIN D3) 1,000 units tablet Take 1,000 Units by mouth daily      Cyanocobalamin (VITAMIN B-12) 3000 MCG/ML LIQD Place under the tongue      Multiple Vitamins-Minerals (CENTRUM SILVER 50+WOMEN PO) Take by mouth      Polyethylene Glycol 3350 (MIRALAX PO) Take by mouth      sertraline (ZOLOFT) 25 mg tablet Take 1 tablet (25 mg total) by mouth daily 30 tablet 5    Turmeric 500 MG CAPS Take 2 capsules by mouth in the morning       No current facility-administered medications for this visit.     _______________________________________________________________________  Review of Systems   Constitutional: Negative.    HENT: Negative.     Respiratory: Negative.     Cardiovascular: Negative.    Psychiatric/Behavioral:  The patient is nervous/anxious.          Objective:  Vitals:    03/10/25 1135   BP: 128/78   BP Location: Left arm   Patient Position: Sitting   Cuff Size: Standard   Pulse:  "69   SpO2: 99%   Weight: 51.7 kg (114 lb)   Height: 5' 2\" (1.575 m)     Body mass index is 20.85 kg/m².     Physical Exam  Constitutional:       Appearance: Normal appearance.   HENT:      Head: Normocephalic and atraumatic.      Right Ear: External ear normal.      Left Ear: External ear normal.   Cardiovascular:      Rate and Rhythm: Normal rate and regular rhythm.      Heart sounds: Normal heart sounds.   Pulmonary:      Effort: Pulmonary effort is normal.      Breath sounds: Normal breath sounds.   Musculoskeletal:         General: Normal range of motion.   Skin:     General: Skin is warm.   Neurological:      General: No focal deficit present.      Mental Status: She is alert and oriented to person, place, and time.   Psychiatric:         Mood and Affect: Mood normal.         Thought Content: Thought content normal.         "

## 2025-04-23 NOTE — PROGRESS NOTES
"Name: Ashleigh Encinas      : 1950      MRN: [id number]  Encounter Provider: Brea Deng MD  Encounter Date: 2025   Encounter department: [facility]  :  Assessment & Plan  Generalized anxiety disorder  Still not resolved although mild improvement will increase dose to 50mg  po qd and follow up 1 mo  Orders:  •  sertraline (ZOLOFT) 50 mg tablet; Take 1 tablet (50 mg total) by mouth daily    Encounter for immunization    Orders:  •  Pneumococcal Conjugate Vaccine 20-valent (Pcv20)    Urinary frequency  Check urine  Orders:  •  POCT urine dip  •  Urine culture    Urinary tract infection without hematuria, site unspecified           History of Present Illness   Pt here for reevaluation of generalized anxiety now on zoloft feels a little better would  like to go up on dose  pt also  wants urine checked since when she gets UTI she never has symptoms except some frequency     Review of Systems   Constitutional:  Negative for chills and fever.   Genitourinary:  Positive for frequency. Negative for dysuria, hematuria and urgency.   Psychiatric/Behavioral:  Negative for dysphoric mood. The patient is nervous/anxious (improved but not resolved).        Objective   /80 (BP Location: Left arm, Patient Position: Sitting, Cuff Size: Standard)   Pulse 66   Temp 98 °F (36.7 °C) (Tympanic)   Resp 16   Ht 5' 2\" (1.575 m)   Wt 51.3 kg (113 lb)   LMP 1998 (Approximate)   SpO2 99%   BMI 20.67 kg/m²      Physical Exam  Constitutional:       Appearance: Normal appearance. She is well-developed.   HENT:      Head: Normocephalic.   Pulmonary:      Effort: Pulmonary effort is normal. No respiratory distress.      Breath sounds: Normal breath sounds.   Abdominal:      Tenderness: There is no abdominal tenderness. There is no right CVA tenderness or left CVA tenderness.   Neurological:      General: No focal deficit present.      Mental Status: She is alert and oriented to person, " place, and time. Mental status is at baseline.   Psychiatric:         Behavior: Behavior normal.         Thought Content: Thought content normal.         Judgment: Judgment normal.

## 2025-04-23 NOTE — ASSESSMENT & PLAN NOTE
Still not resolved although mild improvement will increase dose to 50mg  po qd and follow up 1 mo  Orders:  •  sertraline (ZOLOFT) 50 mg tablet; Take 1 tablet (50 mg total) by mouth daily

## 2025-04-29 ENCOUNTER — OFFICE VISIT (OUTPATIENT)
Dept: FAMILY MEDICINE CLINIC | Facility: CLINIC | Age: 75
End: 2025-04-29
Payer: COMMERCIAL

## 2025-04-29 VITALS
HEART RATE: 66 BPM | SYSTOLIC BLOOD PRESSURE: 130 MMHG | RESPIRATION RATE: 16 BRPM | WEIGHT: 113 LBS | TEMPERATURE: 98 F | DIASTOLIC BLOOD PRESSURE: 80 MMHG | BODY MASS INDEX: 20.8 KG/M2 | HEIGHT: 62 IN | OXYGEN SATURATION: 99 %

## 2025-04-29 DIAGNOSIS — R35.0 URINARY FREQUENCY: ICD-10-CM

## 2025-04-29 DIAGNOSIS — Z23 ENCOUNTER FOR IMMUNIZATION: ICD-10-CM

## 2025-04-29 DIAGNOSIS — N39.0 URINARY TRACT INFECTION WITHOUT HEMATURIA, SITE UNSPECIFIED: ICD-10-CM

## 2025-04-29 DIAGNOSIS — F41.1 GENERALIZED ANXIETY DISORDER: Primary | ICD-10-CM

## 2025-04-29 LAB
SL AMB  POCT GLUCOSE, UA: ABNORMAL
SL AMB LEUKOCYTE ESTERASE,UA: ABNORMAL
SL AMB POCT BILIRUBIN,UA: ABNORMAL
SL AMB POCT BLOOD,UA: ABNORMAL
SL AMB POCT CLARITY,UA: ABNORMAL
SL AMB POCT COLOR,UA: YELLOW
SL AMB POCT KETONES,UA: ABNORMAL
SL AMB POCT NITRITE,UA: ABNORMAL
SL AMB POCT PH,UA: 5
SL AMB POCT SPECIFIC GRAVITY,UA: 1.01
SL AMB POCT URINE PROTEIN: ABNORMAL
SL AMB POCT UROBILINOGEN: ABNORMAL

## 2025-04-29 PROCEDURE — G2211 COMPLEX E/M VISIT ADD ON: HCPCS | Performed by: FAMILY MEDICINE

## 2025-04-29 PROCEDURE — 87086 URINE CULTURE/COLONY COUNT: CPT | Performed by: FAMILY MEDICINE

## 2025-04-29 PROCEDURE — 90677 PCV20 VACCINE IM: CPT | Performed by: FAMILY MEDICINE

## 2025-04-29 PROCEDURE — 81002 URINALYSIS NONAUTO W/O SCOPE: CPT | Performed by: FAMILY MEDICINE

## 2025-04-29 PROCEDURE — 87077 CULTURE AEROBIC IDENTIFY: CPT | Performed by: FAMILY MEDICINE

## 2025-04-29 PROCEDURE — 87186 SC STD MICRODIL/AGAR DIL: CPT | Performed by: FAMILY MEDICINE

## 2025-04-29 PROCEDURE — G0009 ADMIN PNEUMOCOCCAL VACCINE: HCPCS | Performed by: FAMILY MEDICINE

## 2025-04-29 PROCEDURE — 99214 OFFICE O/P EST MOD 30 MIN: CPT | Performed by: FAMILY MEDICINE

## 2025-04-29 RX ORDER — NITROFURANTOIN 25; 75 MG/1; MG/1
100 CAPSULE ORAL 2 TIMES DAILY
Qty: 14 CAPSULE | Refills: 0 | Status: SHIPPED | OUTPATIENT
Start: 2025-04-29 | End: 2025-05-06

## 2025-05-01 ENCOUNTER — RESULTS FOLLOW-UP (OUTPATIENT)
Dept: FAMILY MEDICINE CLINIC | Facility: CLINIC | Age: 75
End: 2025-05-01

## 2025-05-01 LAB — BACTERIA UR CULT: ABNORMAL

## 2025-05-19 ENCOUNTER — RA CDI HCC (OUTPATIENT)
Dept: OTHER | Facility: HOSPITAL | Age: 75
End: 2025-05-19

## 2025-05-22 NOTE — ASSESSMENT & PLAN NOTE
Now on sertraline 50 mcg po qd  still not controlled will increase to 100mg po qd     Orders:    sertraline (ZOLOFT) 100 mg tablet; Take 1 tablet (100 mg total) by mouth daily

## 2025-05-22 NOTE — PROGRESS NOTES
"Name: Ashleigh Encinas      : 1950      MRN: [id number]  Encounter Provider: Brea Deng MD  Encounter Date: 2025   Encounter department: [facility]  :  Assessment & Plan  Generalized anxiety disorder  Now on sertraline 50 mcg po qd  still not controlled will increase to 100mg po qd     Orders:    sertraline (ZOLOFT) 100 mg tablet; Take 1 tablet (100 mg total) by mouth daily    Dysuria    Orders:    POCT urine dip    Recurrent UTI   Ethel pos on dip send out for culture    Orders:    Urine culture    nitrofurantoin (MACROBID) 100 mg capsule; Take 1 capsule (100 mg total) by mouth 2 (two) times a day for 7 days           History of Present Illness   Pt here for reevaluation of generalized anxiety now on increased sertraline feels she still needs to increase dose still very anxious also recently has some dysuria and frequency no fever no chills no abd pain no back pain      Review of Systems   Constitutional:  Negative for fever.   Gastrointestinal:  Negative for abdominal distention and abdominal pain.   Genitourinary:  Positive for dysuria, frequency and urgency.   Psychiatric/Behavioral:  Positive for dysphoric mood (mild). Negative for self-injury and suicidal ideas. The patient is nervous/anxious (still anxious).        Objective   /72 (BP Location: Left arm, Patient Position: Sitting, Cuff Size: Standard)   Pulse 61   Temp 97.7 °F (36.5 °C) (Tympanic)   Resp 16   Ht 5' 2\" (1.575 m)   Wt 51.3 kg (113 lb)   LMP 1998 (Approximate)   SpO2 93%   BMI 20.67 kg/m²      Physical Exam  Constitutional:       Appearance: Normal appearance. She is well-developed. She is not ill-appearing.   HENT:      Head: Normocephalic.     Cardiovascular:      Rate and Rhythm: Normal rate and regular rhythm.   Pulmonary:      Effort: Pulmonary effort is normal. No respiratory distress.      Breath sounds: Normal breath sounds.   Abdominal:      General: Abdomen is flat. Bowel " sounds are normal. There is no distension.      Palpations: Abdomen is soft.      Tenderness: There is no abdominal tenderness. There is no right CVA tenderness, left CVA tenderness, guarding or rebound.     Neurological:      General: No focal deficit present.      Mental Status: She is alert and oriented to person, place, and time. Mental status is at baseline.     Psychiatric:         Behavior: Behavior normal.         Thought Content: Thought content normal.         Judgment: Judgment normal.

## 2025-05-28 ENCOUNTER — OFFICE VISIT (OUTPATIENT)
Dept: FAMILY MEDICINE CLINIC | Facility: CLINIC | Age: 75
End: 2025-05-28
Payer: COMMERCIAL

## 2025-05-28 VITALS
BODY MASS INDEX: 20.8 KG/M2 | RESPIRATION RATE: 16 BRPM | TEMPERATURE: 97.7 F | HEIGHT: 62 IN | WEIGHT: 113 LBS | DIASTOLIC BLOOD PRESSURE: 72 MMHG | SYSTOLIC BLOOD PRESSURE: 124 MMHG | HEART RATE: 61 BPM | OXYGEN SATURATION: 93 %

## 2025-05-28 DIAGNOSIS — F41.1 GENERALIZED ANXIETY DISORDER: Primary | ICD-10-CM

## 2025-05-28 DIAGNOSIS — N39.0 RECURRENT UTI: ICD-10-CM

## 2025-05-28 DIAGNOSIS — R30.0 DYSURIA: ICD-10-CM

## 2025-05-28 LAB
SL AMB  POCT GLUCOSE, UA: NORMAL
SL AMB LEUKOCYTE ESTERASE,UA: NORMAL
SL AMB POCT BILIRUBIN,UA: NORMAL
SL AMB POCT BLOOD,UA: NORMAL
SL AMB POCT KETONES,UA: NORMAL
SL AMB POCT NITRITE,UA: NORMAL
SL AMB POCT PH,UA: 6
SL AMB POCT SPECIFIC GRAVITY,UA: NORMAL
SL AMB POCT URINE PROTEIN: NORMAL
SL AMB POCT UROBILINOGEN: 0.2

## 2025-05-28 PROCEDURE — 87086 URINE CULTURE/COLONY COUNT: CPT | Performed by: FAMILY MEDICINE

## 2025-05-28 PROCEDURE — 87186 SC STD MICRODIL/AGAR DIL: CPT | Performed by: FAMILY MEDICINE

## 2025-05-28 PROCEDURE — 81002 URINALYSIS NONAUTO W/O SCOPE: CPT | Performed by: FAMILY MEDICINE

## 2025-05-28 PROCEDURE — 99214 OFFICE O/P EST MOD 30 MIN: CPT | Performed by: FAMILY MEDICINE

## 2025-05-28 PROCEDURE — 87077 CULTURE AEROBIC IDENTIFY: CPT | Performed by: FAMILY MEDICINE

## 2025-05-28 RX ORDER — SERTRALINE HYDROCHLORIDE 100 MG/1
100 TABLET, FILM COATED ORAL DAILY
Qty: 90 TABLET | Refills: 3 | Status: SHIPPED | OUTPATIENT
Start: 2025-05-28

## 2025-05-28 RX ORDER — NITROFURANTOIN 25; 75 MG/1; MG/1
100 CAPSULE ORAL 2 TIMES DAILY
Qty: 14 CAPSULE | Refills: 0 | Status: SHIPPED | OUTPATIENT
Start: 2025-05-28 | End: 2025-06-04

## 2025-05-28 NOTE — ASSESSMENT & PLAN NOTE
Ethel pos on dip send out for culture    Orders:    Urine culture    nitrofurantoin (MACROBID) 100 mg capsule; Take 1 capsule (100 mg total) by mouth 2 (two) times a day for 7 days

## 2025-05-30 ENCOUNTER — RESULTS FOLLOW-UP (OUTPATIENT)
Dept: FAMILY MEDICINE CLINIC | Facility: CLINIC | Age: 75
End: 2025-05-30

## 2025-05-30 LAB — BACTERIA UR CULT: ABNORMAL

## 2025-06-27 PROBLEM — N39.0 RECURRENT UTI: Status: RESOLVED | Noted: 2025-05-28 | Resolved: 2025-06-27

## 2025-08-18 ENCOUNTER — HOSPITAL ENCOUNTER (OUTPATIENT)
Dept: RADIOLOGY | Facility: HOSPITAL | Age: 75
Discharge: HOME | End: 2025-08-18
Payer: COMMERCIAL

## 2025-08-18 DIAGNOSIS — M16.12 PRIMARY OSTEOARTHRITIS OF LEFT HIP: ICD-10-CM

## 2025-08-18 DIAGNOSIS — M25.552 LEFT HIP PAIN: ICD-10-CM

## 2025-08-18 PROCEDURE — 73502 X-RAY EXAM HIP UNI 2-3 VIEWS: CPT
